# Patient Record
Sex: MALE | Race: WHITE | Employment: OTHER | URBAN - METROPOLITAN AREA
[De-identification: names, ages, dates, MRNs, and addresses within clinical notes are randomized per-mention and may not be internally consistent; named-entity substitution may affect disease eponyms.]

---

## 2021-02-15 NOTE — PROGRESS NOTES
hospitalization, the order may or may not be in effect during this procedure. I give my doctor permission to give me blood or blood products. I understand that there are risks with receiving blood such as hepatitis, AIDS, fever, or allergic reaction. I acknowledge that the risks, benefits, and alternatives of this treatment have been explained to me and that no express or implied warranty has been given by the hospital, any blood bank, or any person or entity as to the blood or blood components transfused. At the discretion of my doctor, I agree to allow observers, equipment/product representatives and allow photographing, and/or televising of the procedure, provided my name or identity is maintained confidentially. I agree the hospital may dispose of or use for scientific or educational purposes any tissue, fluid, or body parts which may be removed.     ________________________________Date________Time______ am/pm  (Madelia One)  Patient or Signature of Closest Relative or Legal Guardian    ________________________________Date________Time______am/pm      Page 1 of  1  Witness

## 2021-02-17 ENCOUNTER — NURSE ONLY (OUTPATIENT)
Dept: PRIMARY CARE CLINIC | Age: 71
End: 2021-02-17
Payer: OTHER GOVERNMENT

## 2021-02-17 DIAGNOSIS — Z01.818 PREOP EXAMINATION: Primary | ICD-10-CM

## 2021-02-17 PROCEDURE — 99211 OFF/OP EST MAY X REQ PHY/QHP: CPT | Performed by: NURSE PRACTITIONER

## 2021-02-17 RX ORDER — MULTIVITAMIN WITH IRON
1 TABLET ORAL DAILY
COMMUNITY

## 2021-02-17 RX ORDER — ATORVASTATIN CALCIUM 40 MG/1
40 TABLET, FILM COATED ORAL DAILY
COMMUNITY

## 2021-02-17 RX ORDER — OMEPRAZOLE 20 MG/1
20 CAPSULE, DELAYED RELEASE ORAL DAILY
COMMUNITY

## 2021-02-17 NOTE — PROGRESS NOTES
[phone]. 4. Please call [phone] option #2 option #2 if you have not been preregistered yet. On the day of your procedure bring your insurance card and photo ID. You will be registered at your bedside once brought back to your room. 5. DO NOT EAT ANYTHING eight hours prior to your arrival for surgery. May have 8 ounces of water 4 hours prior to your arrival for surgery. NOTE: ALL Gastric, Bariatric and Bowel surgery patients MUST follow their surgeon's instructions. 6. MEDICATIONS    Take the following medications with a SMALL sip of water: carbidopa-levodopa and omeprazole    Use your usual dose of inhalers the morning of surgery. BRING your rescue inhaler with you to hospital.    Anesthesia does NOT want you to take insulin the morning of surgery. They will control your blood sugar while you are at the hospital. Please contact your ordering physician for instructions regarding your insulin the night before your procedure. If you have an insulin pump, please keep it set on basal rate. 7. Do not swallow water when brushing teeth. No gum, candy, mints or ice chips. Refrain from smoking or at least decrease the amount. 8. Dress in loose, comfortable clothing appropriate for redressing after your procedure. Do not wear jewelry (including body piercings), make-up (especially NO eye make-up), fingernail polish (NO toenail polish if foot/leg surgery), lotion, powders or metal hairclips. 9. Dentures, glasses, or contacts will need to be removed before your procedure. Bring cases for your glasses, contacts, dentures, or hearing aids to protect them while you are in surgery. 10. If you use a CPAP, please bring it with you on the day of your procedure. 11. We recommend that valuable personal  belongings such as cash, cell phones, e-tablets or jewelry, be left at home during your stay. The hospital will not be responsible for valuables that are not secured in the hospital safe. However, if your insurance requires a co-pay, you may want to bring a method of payment, i.e. Check or credit card, if you wish to pay your co-pay the day of surgery. 12. If you are to stay overnight, you may bring a bag with personal items. Please have any large items you may need brought in by your family after your arrival to your hospital room. 15. If you have a Living Will or Durable Power of , please bring a copy on the day of your procedure. 15. With your permission, one family member may accompany you while you are being prepared for surgery. Once you are ready, additional family members may join you. HOW WE KEEP YOU SAFE and WORK TO PREVENT SURGICAL SITE INFECTIONS:  1. Health care workers should always check your ID bracelet to verify your name and birth date. You will be asked many times to state your name, date of birth, and allergies. 2. Health care workers should always clean their hands with soap or alcohol gel before providing care to you. It is okay to ask anyone if they cleaned their hands before they touch you. 3. You will be actively involved in verifying the type of procedure you are having and ensuring the correct surgical site. This will be confirmed multiple times prior to your procedure. Do NOT elina your surgery site UNLESS instructed to by your surgeon. 4. Do not shave or wax for 72 hours prior to procedure near your operative site. Shaving with a razor can irritate your skin and make it easier to develop an infection. On the day of your procedure, any hair that needs to be removed near the surgical site will be clipped by a healthcare worker using a special clippers designed to avoid skin irritation. 5. When you are in the operating room, your surgical site will be cleansed with a special soap, and in most cases, you will be given an antibiotic before the surgery begins. What to expect AFTER YOUR PROCEDURE:  1.  Immediately following your procedure, your will be taken to the PACU for the first phase of your recovery. Your nurse will help you recover from any potential side effects of anesthesia, such as extreme drowsiness, changes in your vital signs or breathing patterns. Nausea, headache, muscle aches, or sore throat may also occur after anesthesia. Your nurse will help you manage these potential side effects. 2. For comfort and safety, arrange to have someone at home with you for the first 24 hours after discharge. 3. You and your family will be given written instructions about your diet, activity, dressing care, medications, and return visits. 4. Once at home, should issues with nausea, pain, or bleeding occur, or should you notice any signs of infection, you should call your surgeon. 5. Always clean your hands before and after caring for your wound. Do not let your family touch your surgery site without cleaning their hands. 6. Narcotic pain medications can cause significant constipation. You may want to add a stool softener to your postoperative medication schedule or speak to your surgeon on how best to manage this SIDE EFFECT. SPECIAL INSTRUCTIONS spoke with wife Amos Pinto, advised to call surgeon with questions on insurance approval, wife advised to have covid testing done today and initially she said he would have it done here, then said [place] in [place] of which I said we need it sooner than later and that our covid tent might be closed tomorrow due to anticipation of snow storm. Also, wife stated cardiac clearance had been requested by his pcp and she had not heard back on that and she will reach out to them. Wife acknowledges understanding of pre op instructions. Thank you for allowing us to care for you. We strive to exceed your expectations in the delivery of care and service provided to you and your family.      If you need to contact the [facility] [place] [address] staff for any reason, please call us at [phone]    Instructions reviewed with patient during preadmission testing phone interview. Wes Moss. 2/17/2021 .9:53 AM      ADDITIONAL EDUCATIONAL INFORMATION REVIEWED PER PHONE WITH YOU AND/OR YOUR FAMILY:  No Bring a urine sample on day of surgery  Yes Hibiclens® Bathing Instructions   No Antibacterial Soap

## 2021-02-17 NOTE — PROGRESS NOTES
Wife states H & P done at [facility] with Dr. Tra Wheat [phone] I spoke with Nallely Maki there and she states it was just faxed to [facility] this morning along with labs and ekg. I left message with Evin De La Torre at [facility] office to place above in media or fax to us. Wife stated also she thought patient needed cardiac clearance which she stated was through same [facility] however in speaking with Nallely Maki there she states she does not see that a consult was requested. Will review H & P when received to clarify if cardiac clearance was requested officially. Informed wife of covid testing needed to be done today; explained that our covid tent might not be open tomorrow due to weather changes expected. Initially she stated they will be down to [facility] and have done today, hours given. Then, she stated he might go to the [place] to have done. I expressed we needed it completed sooner than later of which she acknowledged understanding. Spoke with Evin De La Torre at Dr. Jose Poon office; Informed her PCP H & P is incomplete, no signature and  It stats f/u with cardiology so is cardiac clearance needed and EKG showing sinus bradycardia and septal infarc age undetermined so that these things need to be addressed; my office number given to her. Evin De La Torre from Dr. Jose Poon called today 2/18/21 with an update; a person from the [place] called her and stated patient is seeing cardiologist today (unsure of name of physician he is seeing) and they will touch base with her tomorrow with the findings. Evin De La Torre said she will call me with that information tomorrow and if she receives any documentation she will fax to us.

## 2021-02-17 NOTE — PROGRESS NOTES
Discharge planning:  Patient to be discharged day of surgery to home with wife, Dwight Chopra for recovery.

## 2021-02-18 LAB — SARS-COV-2, NAA: NOT DETECTED

## 2021-02-19 ENCOUNTER — ANESTHESIA EVENT (OUTPATIENT)
Dept: OPERATING ROOM | Age: 71
End: 2021-02-19
Payer: OTHER GOVERNMENT

## 2021-02-23 ENCOUNTER — ANESTHESIA (OUTPATIENT)
Dept: OPERATING ROOM | Age: 71
End: 2021-02-23
Payer: OTHER GOVERNMENT

## 2021-02-23 ENCOUNTER — HOSPITAL ENCOUNTER (OUTPATIENT)
Age: 71
Setting detail: OUTPATIENT SURGERY
Discharge: HOME | End: 2021-02-23
Attending: NEUROLOGICAL SURGERY | Admitting: NEUROLOGICAL SURGERY
Payer: OTHER GOVERNMENT

## 2021-02-23 VITALS
HEIGHT: 67 IN | BODY MASS INDEX: 21.35 KG/M2 | RESPIRATION RATE: 20 BRPM | DIASTOLIC BLOOD PRESSURE: 62 MMHG | TEMPERATURE: 98.1 F | SYSTOLIC BLOOD PRESSURE: 100 MMHG | HEART RATE: 62 BPM | OXYGEN SATURATION: 96 % | WEIGHT: 136 LBS

## 2021-02-23 VITALS — OXYGEN SATURATION: 99 % | SYSTOLIC BLOOD PRESSURE: 85 MMHG | DIASTOLIC BLOOD PRESSURE: 54 MMHG

## 2021-02-23 LAB
ABO/RH: NORMAL
ANTIBODY SCREEN: NORMAL
APTT: 36.5 SEC (ref 24.2–36.2)
GLUCOSE BLD-MCNC: 82 MG/DL (ref 70–99)
INR BLD: 1.02 (ref 0.86–1.14)
PERFORMED ON: NORMAL
PROTHROMBIN TIME: 11.8 SEC (ref 10–13.2)

## 2021-02-23 PROCEDURE — 2580000003 HC RX 258: Performed by: ANESTHESIOLOGY

## 2021-02-23 PROCEDURE — 7100000000 HC PACU RECOVERY - FIRST 15 MIN: Performed by: NEUROLOGICAL SURGERY

## 2021-02-23 PROCEDURE — 85730 THROMBOPLASTIN TIME PARTIAL: CPT

## 2021-02-23 PROCEDURE — 3600000014 HC SURGERY LEVEL 4 ADDTL 15MIN: Performed by: NEUROLOGICAL SURGERY

## 2021-02-23 PROCEDURE — C1787 PATIENT PROGR, NEUROSTIM: HCPCS | Performed by: NEUROLOGICAL SURGERY

## 2021-02-23 PROCEDURE — 85610 PROTHROMBIN TIME: CPT

## 2021-02-23 PROCEDURE — 7100000011 HC PHASE II RECOVERY - ADDTL 15 MIN: Performed by: NEUROLOGICAL SURGERY

## 2021-02-23 PROCEDURE — 3700000000 HC ANESTHESIA ATTENDED CARE: Performed by: NEUROLOGICAL SURGERY

## 2021-02-23 PROCEDURE — 3700000001 HC ADD 15 MINUTES (ANESTHESIA): Performed by: NEUROLOGICAL SURGERY

## 2021-02-23 PROCEDURE — 6360000002 HC RX W HCPCS: Performed by: NURSE ANESTHETIST, CERTIFIED REGISTERED

## 2021-02-23 PROCEDURE — 7100000001 HC PACU RECOVERY - ADDTL 15 MIN: Performed by: NEUROLOGICAL SURGERY

## 2021-02-23 PROCEDURE — 6360000002 HC RX W HCPCS: Performed by: NEUROLOGICAL SURGERY

## 2021-02-23 PROCEDURE — 86901 BLOOD TYPING SEROLOGIC RH(D): CPT

## 2021-02-23 PROCEDURE — 7100000010 HC PHASE II RECOVERY - FIRST 15 MIN: Performed by: NEUROLOGICAL SURGERY

## 2021-02-23 PROCEDURE — 86900 BLOOD TYPING SEROLOGIC ABO: CPT

## 2021-02-23 PROCEDURE — 86850 RBC ANTIBODY SCREEN: CPT

## 2021-02-23 PROCEDURE — 2580000003 HC RX 258: Performed by: NEUROLOGICAL SURGERY

## 2021-02-23 PROCEDURE — 2500000003 HC RX 250 WO HCPCS: Performed by: NEUROLOGICAL SURGERY

## 2021-02-23 PROCEDURE — 3600000004 HC SURGERY LEVEL 4 BASE: Performed by: NEUROLOGICAL SURGERY

## 2021-02-23 PROCEDURE — C1767 GENERATOR, NEURO NON-RECHARG: HCPCS | Performed by: NEUROLOGICAL SURGERY

## 2021-02-23 PROCEDURE — 2709999900 HC NON-CHARGEABLE SUPPLY: Performed by: NEUROLOGICAL SURGERY

## 2021-02-23 PROCEDURE — 2720000010 HC SURG SUPPLY STERILE: Performed by: NEUROLOGICAL SURGERY

## 2021-02-23 PROCEDURE — 6360000002 HC RX W HCPCS: Performed by: ANESTHESIOLOGY

## 2021-02-23 DEVICE — IMPLANTABLE DEVICE: Type: IMPLANTABLE DEVICE | Site: CHEST | Status: FUNCTIONAL

## 2021-02-23 RX ORDER — SODIUM CHLORIDE, SODIUM LACTATE, POTASSIUM CHLORIDE, CALCIUM CHLORIDE 600; 310; 30; 20 MG/100ML; MG/100ML; MG/100ML; MG/100ML
INJECTION, SOLUTION INTRAVENOUS CONTINUOUS
Status: DISCONTINUED | OUTPATIENT
Start: 2021-02-23 | End: 2021-02-23 | Stop reason: HOSPADM

## 2021-02-23 RX ORDER — OXYCODONE HYDROCHLORIDE AND ACETAMINOPHEN 5; 325 MG/1; MG/1
1 TABLET ORAL PRN
Status: DISCONTINUED | OUTPATIENT
Start: 2021-02-23 | End: 2021-02-23 | Stop reason: HOSPADM

## 2021-02-23 RX ORDER — OXYCODONE HYDROCHLORIDE AND ACETAMINOPHEN 5; 325 MG/1; MG/1
2 TABLET ORAL PRN
Status: DISCONTINUED | OUTPATIENT
Start: 2021-02-23 | End: 2021-02-23 | Stop reason: HOSPADM

## 2021-02-23 RX ORDER — PROCHLORPERAZINE EDISYLATE 5 MG/ML
5 INJECTION INTRAMUSCULAR; INTRAVENOUS
Status: DISCONTINUED | OUTPATIENT
Start: 2021-02-23 | End: 2021-02-23 | Stop reason: HOSPADM

## 2021-02-23 RX ORDER — ONDANSETRON 2 MG/ML
4 INJECTION INTRAMUSCULAR; INTRAVENOUS
Status: DISCONTINUED | OUTPATIENT
Start: 2021-02-23 | End: 2021-02-23 | Stop reason: HOSPADM

## 2021-02-23 RX ORDER — ONDANSETRON 2 MG/ML
INJECTION INTRAMUSCULAR; INTRAVENOUS PRN
Status: DISCONTINUED | OUTPATIENT
Start: 2021-02-23 | End: 2021-02-23 | Stop reason: SDUPTHER

## 2021-02-23 RX ORDER — CEFAZOLIN SODIUM 2 G/50ML
2000 SOLUTION INTRAVENOUS ONCE
Status: COMPLETED | OUTPATIENT
Start: 2021-02-23 | End: 2021-02-23

## 2021-02-23 RX ORDER — FENTANYL CITRATE 50 UG/ML
25 INJECTION, SOLUTION INTRAMUSCULAR; INTRAVENOUS EVERY 5 MIN PRN
Status: DISCONTINUED | OUTPATIENT
Start: 2021-02-23 | End: 2021-02-23 | Stop reason: HOSPADM

## 2021-02-23 RX ORDER — PROPOFOL 10 MG/ML
INJECTION, EMULSION INTRAVENOUS CONTINUOUS PRN
Status: DISCONTINUED | OUTPATIENT
Start: 2021-02-23 | End: 2021-02-23 | Stop reason: SDUPTHER

## 2021-02-23 RX ORDER — PROPOFOL 10 MG/ML
INJECTION, EMULSION INTRAVENOUS PRN
Status: DISCONTINUED | OUTPATIENT
Start: 2021-02-23 | End: 2021-02-23 | Stop reason: SDUPTHER

## 2021-02-23 RX ORDER — HYDRALAZINE HYDROCHLORIDE 20 MG/ML
5 INJECTION INTRAMUSCULAR; INTRAVENOUS EVERY 10 MIN PRN
Status: DISCONTINUED | OUTPATIENT
Start: 2021-02-23 | End: 2021-02-23 | Stop reason: HOSPADM

## 2021-02-23 RX ORDER — LIDOCAINE HYDROCHLORIDE 20 MG/ML
INJECTION, SOLUTION INTRAVENOUS PRN
Status: DISCONTINUED | OUTPATIENT
Start: 2021-02-23 | End: 2021-02-23 | Stop reason: SDUPTHER

## 2021-02-23 RX ORDER — LABETALOL HYDROCHLORIDE 5 MG/ML
5 INJECTION, SOLUTION INTRAVENOUS EVERY 10 MIN PRN
Status: DISCONTINUED | OUTPATIENT
Start: 2021-02-23 | End: 2021-02-23 | Stop reason: HOSPADM

## 2021-02-23 RX ORDER — FENTANYL CITRATE 50 UG/ML
50 INJECTION, SOLUTION INTRAMUSCULAR; INTRAVENOUS EVERY 5 MIN PRN
Status: DISCONTINUED | OUTPATIENT
Start: 2021-02-23 | End: 2021-02-23 | Stop reason: HOSPADM

## 2021-02-23 RX ORDER — MEPERIDINE HYDROCHLORIDE 25 MG/ML
12.5 INJECTION INTRAMUSCULAR; INTRAVENOUS; SUBCUTANEOUS EVERY 5 MIN PRN
Status: DISCONTINUED | OUTPATIENT
Start: 2021-02-23 | End: 2021-02-23 | Stop reason: HOSPADM

## 2021-02-23 RX ORDER — DIPHENHYDRAMINE HYDROCHLORIDE 50 MG/ML
12.5 INJECTION INTRAMUSCULAR; INTRAVENOUS
Status: DISCONTINUED | OUTPATIENT
Start: 2021-02-23 | End: 2021-02-23 | Stop reason: HOSPADM

## 2021-02-23 RX ADMIN — FENTANYL CITRATE 25 MCG: 50 INJECTION, SOLUTION INTRAMUSCULAR; INTRAVENOUS at 09:30

## 2021-02-23 RX ADMIN — LIDOCAINE HYDROCHLORIDE 100 MG: 20 INJECTION, SOLUTION INTRAVENOUS at 09:23

## 2021-02-23 RX ADMIN — PHENYLEPHRINE HYDROCHLORIDE 80 MCG: 10 INJECTION, SOLUTION INTRAMUSCULAR; INTRAVENOUS; SUBCUTANEOUS at 10:17

## 2021-02-23 RX ADMIN — SODIUM CHLORIDE, POTASSIUM CHLORIDE, SODIUM LACTATE AND CALCIUM CHLORIDE: 600; 310; 30; 20 INJECTION, SOLUTION INTRAVENOUS at 08:24

## 2021-02-23 RX ADMIN — PROPOFOL 30 MG: 10 INJECTION, EMULSION INTRAVENOUS at 09:23

## 2021-02-23 RX ADMIN — PROPOFOL 70 MCG/KG/MIN: 10 INJECTION, EMULSION INTRAVENOUS at 09:27

## 2021-02-23 RX ADMIN — CEFAZOLIN SODIUM 2 G: 2 SOLUTION INTRAVENOUS at 09:28

## 2021-02-23 RX ADMIN — ONDANSETRON 4 MG: 2 INJECTION INTRAMUSCULAR; INTRAVENOUS at 09:40

## 2021-02-23 RX ADMIN — PHENYLEPHRINE HYDROCHLORIDE 80 MCG: 10 INJECTION, SOLUTION INTRAMUSCULAR; INTRAVENOUS; SUBCUTANEOUS at 09:44

## 2021-02-23 RX ADMIN — PHENYLEPHRINE HYDROCHLORIDE 80 MCG: 10 INJECTION, SOLUTION INTRAMUSCULAR; INTRAVENOUS; SUBCUTANEOUS at 09:32

## 2021-02-23 RX ADMIN — PHENYLEPHRINE HYDROCHLORIDE 80 MCG: 10 INJECTION, SOLUTION INTRAMUSCULAR; INTRAVENOUS; SUBCUTANEOUS at 10:00

## 2021-02-23 ASSESSMENT — PULMONARY FUNCTION TESTS
PIF_VALUE: 1
PIF_VALUE: 0
PIF_VALUE: 1
PIF_VALUE: 0
PIF_VALUE: 1

## 2021-02-23 NOTE — PROGRESS NOTES
Patient not talking but gestures and follows commands appropriately. Wife updated. Reports patient does talk.

## 2021-02-23 NOTE — PROGRESS NOTES
PACU Transfer to [facility]    Vitals:    02/23/21 1230   BP: 90/68   Pulse: 66   Resp: 24   Temp: 99.2 °F (37.3 °C)   SpO2: 95%         Intake/Output Summary (Last 24 hours) at 2/23/2021 1232  Last data filed at 2/23/2021 1230  Gross per 24 hour   Intake 915 ml   Output 0 ml   Net 915 ml       Pain assessment:  none  Pain Level: 0(\"no\")    Patient transferred to care of ZACH RN.    2/23/2021 12:32 PM

## 2021-02-23 NOTE — PROGRESS NOTES
Pt to SDS, pt awake and alert, pt denies pain/nausea/dizziness, incision to left chest wall CDI, incision well approximated, ice applied, wife brought to bedside and reviewed d/c instructions, tolerating po intake, peripheral IV removed. Ambulatory Surgery/Procedure Discharge Note    Vitals:    02/23/21 1308   BP: 100/62   Pulse: 62   Resp: 20   Temp:    SpO2:      Discharge criteria met per Hollie score. In: 6116 [P.O.:120; I.V.:915]  Out: 0     Restroom use offered before discharge. Yes    Pain assessment:  none  Pain Level: 0        Patient discharged to home/self care.  Patient discharged via wheel chair by transporter to waiting family/S.O.       2/23/2021 1:17 PM

## 2021-02-23 NOTE — H&P
[facility] Same Day Surgery Update H & P  Department of General Surgery   Surgical Service   Pre-operative History and Physical  Last H & P within the last 30 days. DIAGNOSIS:   Parkinson disease ([facility] [address] [G20]    Procedure(s):  REPLACEMENT OF LEFT DUAL CHANNEL DEEP BRAIN STIMULATOR BATTERY     HISTORY OF PRESENT ILLNESS:   Patient with Parkinson's disease presents today for DBS placement. Covid 19:  Patient denies fever, chills, cough or known exposure to Covid-19. Past Medical History:        Diagnosis Date    Arthritis     generalized    Atrial fibrillation (HCC)     CAD (coronary artery disease)     Dilated aortic root (HCC)     Hyperlipidemia     Limited range of motion (ROM) of shoulder     right    Non-STEMI (non-ST elevated myocardial infarction) ([facility] [address] 2016    Parkinson disease ([facility] [address]     S/P deep brain stimulator placement     Shoulder pain, right     Wears dentures      Past Surgical History:        Procedure Laterality Date    BACK SURGERY      ? lumbar surgery?  KNEE SURGERY Right     tear    SHOULDER SURGERY      right  x 3 with implant     Past Social History:  Social History     Socioeconomic History    Marital status:      Spouse name: None    Number of children: None    Years of education: None    Highest education level: None   Occupational History    None   Social Needs    Financial resource strain: None    Food insecurity     Worry: None     Inability: None    Transportation needs     Medical: None     Non-medical: None   Tobacco Use    Smoking status: Former Smoker     Types: Cigarettes     Quit date:      Years since quittin.1    Smokeless tobacco: Never Used   Substance and Sexual Activity    Alcohol use:  Yes     Alcohol/week: 1.0 standard drinks     Types: 1 Cans of beer per week     Comment: daily    Drug use: No    Sexual activity: None   Lifestyle    Physical activity     Days per week: None Minutes per session: None    Stress: None   Relationships    Social connections     Talks on phone: None     Gets together: None     Attends Rastafarian service: None     Active member of club or organization: None     Attends meetings of clubs or organizations: None     Relationship status: None    Intimate partner violence     Fear of current or ex partner: None     Emotionally abused: None     Physically abused: None     Forced sexual activity: None   Other Topics Concern    None   Social History Narrative    None         Medications Prior to Admission:      Prior to Admission medications    Medication Sig Start Date End Date Taking? Authorizing Provider   omeprazole (PRILOSEC) 20 MG delayed release capsule Take 20 mg by mouth daily   Yes Historical Provider, MD   atorvastatin (LIPITOR) 40 MG tablet Take 40 mg by mouth daily   Yes Historical Provider, MD   sertraline (ZOLOFT) 100 MG tablet Take 100 mg by mouth nightly 2 tablets daily   Yes Historical Provider, MD   Cyanocobalamin (VITAMIN B-12 IJ) Inject as directed every 30 days   Yes Historical Provider, MD   Carbidopa-Levodopa-Entacapone (STALEVO 200 PO) Take 200 mg by mouth three times daily. Yes Historical Provider, MD   Multiple Vitamin (MULTIVITAMIN) TABS tablet Take 1 tablet by mouth daily    Historical Provider, MD   Vitamin D (CHOLECALCIFEROL) 1000 UNITS CAPS capsule Take 1,000 Units by mouth daily.     Historical Provider, MD         Allergies:  Aspirin, Celebrex [celecoxib], and Flexeril [cyclobenzaprine]    PHYSICAL EXAM:      BP (!) 153/85   Pulse 55   Temp 97.7 °F (36.5 °C) (Oral)   Resp 20   Ht 5' 7\" (1.702 m)   Wt 136 lb (61.7 kg)   SpO2 99% Comment: left great toe  BMI 21.30 kg/m²      Airway:  Airway patent with no audible stridor    Heart:  Bradycardic, regular rhythm, No murmur noted    Lungs:  No increased work of breathing, good air exchange, clear to auscultation bilaterally, no crackles or wheezing    Abdomen:  soft, non-distended, non-tender, no rebound tenderness or guarding, normal active bowel sounds and no masses palpated    ASSESSMENT AND PLAN     Patient is a 69 y.o. male with above specified procedure planned. 1.  The patients history and physical was obtained and signed off by the pre-admission testing department. Patient seen and focused exam done today- no new changes since last physical exam on 2/12/21    2. Access to ancillary services are available per request of the provider.     HERMAN Brothers - CNP     2/23/2021

## 2021-02-23 NOTE — PROGRESS NOTES
Pt arrived form OR, s/p REPLACEMENT OF LEFT DUAL CHANNEL DEEP BRAIN STIMULATOR BATTERY - Left, report received from CRNA, pt sleepy on arrival, pt can be confused as reported from CRNA

## 2021-02-23 NOTE — ANESTHESIA POSTPROCEDURE EVALUATION
Department of Anesthesiology  Postprocedure Note    Patient: John Lofton  MRN: [id number]  YOB: 1950  Date of evaluation: 2/23/2021  Time:  11:29 AM     Procedure Summary     Date: 02/23/21 Room / Location: [facility]    Anesthesia Start: 0911 Anesthesia Stop: 1025    Procedure: REPLACEMENT OF LEFT DUAL CHANNEL DEEP BRAIN STIMULATOR BATTERY (Left ) Diagnosis:       Parkinson disease ([facility] [address]      (Parkinson disease ([facility] [address] Halima Earl)    Surgeons: Stacey Hyatt MD Responsible Provider: Antonella Solis MD    Anesthesia Type: MAC ASA Status: 3          Anesthesia Type: MAC    Hollie Phase I: Hollie Score: 8    Hollie Phase II:      Last vitals: Reviewed and per EMR flowsheets.        Anesthesia Post Evaluation    Patient location during evaluation: PACU  Patient participation: complete - patient participated  Level of consciousness: awake  Pain score: 2  Airway patency: patent  Nausea & Vomiting: no nausea and no vomiting  Complications: no  Cardiovascular status: hemodynamically stable  Respiratory status: acceptable  Hydration status: euvolemic

## 2021-02-23 NOTE — PROGRESS NOTES
Dr. Curtis Milling in to see the pt, [employer] rep here as well, settings are the same and device is ON

## 2021-02-24 NOTE — OP NOTE
Pocket was irrigated, hemostased, and closed with two interrupted Vicryl  sutures for deep layers, three interrupted 4-0 Monocryl and Dermabond  for the skin. No complications noted and I certify that I was present  and available for the entire procedure.         Eloy Carrillo MD    D: 02/23/2021 11:01:36       T: 02/23/2021 14:27:54     [email or web address]  Job#: [id number]     Doc#: [id number]    CC:

## 2023-01-01 ENCOUNTER — INPATIENT HOSPITAL (OUTPATIENT)
Dept: URBAN - METROPOLITAN AREA HOSPITAL 56 | Facility: HOSPITAL | Age: 73
End: 2023-01-01

## 2023-01-01 ENCOUNTER — INPATIENT HOSPITAL (OUTPATIENT)
Dept: URBAN - METROPOLITAN AREA HOSPITAL 104 | Facility: HOSPITAL | Age: 73
End: 2023-01-01

## 2023-01-01 DIAGNOSIS — E43 UNSPECIFIED SEVERE PROTEIN-CALORIE MALNUTRITION: ICD-10-CM

## 2023-01-01 DIAGNOSIS — Z43.1 ENCOUNTER FOR ATTENTION TO GASTROSTOMY: ICD-10-CM

## 2023-01-01 DIAGNOSIS — D64.9 ANEMIA, UNSPECIFIED: ICD-10-CM

## 2023-01-01 DIAGNOSIS — G20 PARKINSON'S DISEASE: ICD-10-CM

## 2023-01-01 DIAGNOSIS — L03.311 CELLULITIS OF ABDOMINAL WALL: ICD-10-CM

## 2023-01-01 DIAGNOSIS — K21.9 GASTRO-ESOPHAGEAL REFLUX DISEASE WITHOUT ESOPHAGITIS: ICD-10-CM

## 2023-01-01 DIAGNOSIS — K94.22 GASTROSTOMY INFECTION: ICD-10-CM

## 2023-01-01 PROCEDURE — 99232 SBSQ HOSP IP/OBS MODERATE 35: CPT | Performed by: NURSE PRACTITIONER

## 2023-01-01 PROCEDURE — 99223 1ST HOSP IP/OBS HIGH 75: CPT

## 2023-01-01 PROCEDURE — 99222 1ST HOSP IP/OBS MODERATE 55: CPT | Mod: SA | Performed by: PHYSICIAN ASSISTANT

## 2023-01-01 PROCEDURE — 43246 EGD PLACE GASTROSTOMY TUBE: CPT | Performed by: INTERNAL MEDICINE

## 2023-01-01 PROCEDURE — 99232 SBSQ HOSP IP/OBS MODERATE 35: CPT | Mod: SA | Performed by: PHYSICIAN ASSISTANT

## 2023-10-09 ENCOUNTER — HOSPITAL ENCOUNTER (INPATIENT)
Age: 73
LOS: 4 days | Discharge: OTHER INSTITUTION | End: 2023-10-13
Attending: STUDENT IN AN ORGANIZED HEALTH CARE EDUCATION/TRAINING PROGRAM | Admitting: STUDENT IN AN ORGANIZED HEALTH CARE EDUCATION/TRAINING PROGRAM
Payer: OTHER GOVERNMENT

## 2023-10-09 ENCOUNTER — APPOINTMENT (OUTPATIENT)
Dept: CT IMAGING | Age: 73
End: 2023-10-09
Payer: OTHER GOVERNMENT

## 2023-10-09 ENCOUNTER — APPOINTMENT (OUTPATIENT)
Dept: GENERAL RADIOLOGY | Age: 73
End: 2023-10-09
Payer: OTHER GOVERNMENT

## 2023-10-09 DIAGNOSIS — D72.829 LEUKOCYTOSIS, UNSPECIFIED TYPE: ICD-10-CM

## 2023-10-09 DIAGNOSIS — D64.9 ANEMIA, UNSPECIFIED TYPE: ICD-10-CM

## 2023-10-09 DIAGNOSIS — K94.23 PEG TUBE MALFUNCTION (HCC): Primary | ICD-10-CM
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
## 2023-10-09 LAB
ALBUMIN SERPL-MCNC: 3.1 GM/DL (ref 3.4–5)
ALP BLD-CCNC: 104 IU/L (ref 40–129)
ALT SERPL-CCNC: 26 U/L (ref 10–40)
ANION GAP SERPL CALCULATED.3IONS-SCNC: 8 MMOL/L (ref 4–16)
AST SERPL-CCNC: 20 IU/L (ref 15–37)
BASOPHILS ABSOLUTE: 0.1 K/CU MM
BASOPHILS RELATIVE PERCENT: 0.4 % (ref 0–1)
BILIRUB SERPL-MCNC: 0.2 MG/DL (ref 0–1)
BUN SERPL-MCNC: 19 MG/DL (ref 6–23)
CALCIUM SERPL-MCNC: 9.1 MG/DL (ref 8.3–10.6)
CHLORIDE BLD-SCNC: 99 MMOL/L (ref 99–110)
CO2: 33 MMOL/L (ref 21–32)
CREAT SERPL-MCNC: 0.6 MG/DL (ref 0.9–1.3)
DIFFERENTIAL TYPE: ABNORMAL
EOSINOPHILS ABSOLUTE: 0.1 K/CU MM
EOSINOPHILS RELATIVE PERCENT: 0.4 % (ref 0–3)
GFR SERPL CREATININE-BSD FRML MDRD: >60 ML/MIN/1.73M2
GLUCOSE SERPL-MCNC: 93 MG/DL (ref 70–99)
HCT VFR BLD CALC: 34.5 % (ref 42–52)
HEMOGLOBIN: 11.4 GM/DL (ref 13.5–18)
IMMATURE NEUTROPHIL %: 0.5 % (ref 0–0.43)
INR BLD: 1 INDEX
LYMPHOCYTES ABSOLUTE: 0.7 K/CU MM
LYMPHOCYTES RELATIVE PERCENT: 3.9 % (ref 24–44)
MCH RBC QN AUTO: 32.7 PG (ref 27–31)
MCHC RBC AUTO-ENTMCNC: 33 % (ref 32–36)
MCV RBC AUTO: 98.9 FL (ref 78–100)
MONOCYTES ABSOLUTE: 1.5 K/CU MM
MONOCYTES RELATIVE PERCENT: 9.1 % (ref 0–4)
NUCLEATED RBC %: 0 %
PDW BLD-RTO: 13.7 % (ref 11.7–14.9)
PLATELET # BLD: 426 K/CU MM (ref 140–440)
PMV BLD AUTO: 9.9 FL (ref 7.5–11.1)
POTASSIUM SERPL-SCNC: 4.4 MMOL/L (ref 3.5–5.1)
PROTHROMBIN TIME: 13.8 SECONDS (ref 11.7–14.5)
RBC # BLD: 3.49 M/CU MM (ref 4.6–6.2)
SEGMENTED NEUTROPHILS ABSOLUTE COUNT: 14.2 K/CU MM
SEGMENTED NEUTROPHILS RELATIVE PERCENT: 85.7 % (ref 36–66)
SODIUM BLD-SCNC: 140 MMOL/L (ref 135–145)
TOTAL IMMATURE NEUTOROPHIL: 0.08 K/CU MM
TOTAL NUCLEATED RBC: 0 K/CU MM
TOTAL PROTEIN: 6.4 GM/DL (ref 6.4–8.2)
WBC # BLD: 16.5 K/CU MM (ref 4–10.5)

## 2023-10-09 PROCEDURE — 70450 CT HEAD/BRAIN W/O DYE: CPT

## 2023-10-09 PROCEDURE — 6360000004 HC RX CONTRAST MEDICATION: Performed by: STUDENT IN AN ORGANIZED HEALTH CARE EDUCATION/TRAINING PROGRAM

## 2023-10-09 PROCEDURE — 85025 COMPLETE CBC W/AUTO DIFF WBC: CPT

## 2023-10-09 PROCEDURE — 0DP6XUZ REMOVAL OF FEEDING DEVICE FROM STOMACH, EXTERNAL APPROACH: ICD-10-PCS | Performed by: STUDENT IN AN ORGANIZED HEALTH CARE EDUCATION/TRAINING PROGRAM

## 2023-10-09 PROCEDURE — 72125 CT NECK SPINE W/O DYE: CPT

## 2023-10-09 PROCEDURE — 1200000000 HC SEMI PRIVATE

## 2023-10-09 PROCEDURE — 80053 COMPREHEN METABOLIC PANEL: CPT

## 2023-10-09 PROCEDURE — 74177 CT ABD & PELVIS W/CONTRAST: CPT

## 2023-10-09 PROCEDURE — 99285 EMERGENCY DEPT VISIT HI MDM: CPT

## 2023-10-09 PROCEDURE — 71045 X-RAY EXAM CHEST 1 VIEW: CPT

## 2023-10-09 PROCEDURE — 72170 X-RAY EXAM OF PELVIS: CPT

## 2023-10-09 PROCEDURE — 85610 PROTHROMBIN TIME: CPT

## 2023-10-09 RX ORDER — ALBUTEROL SULFATE 90 UG/1
2 AEROSOL, METERED RESPIRATORY (INHALATION) EVERY 6 HOURS PRN
COMMUNITY

## 2023-10-09 RX ORDER — ONDANSETRON 4 MG/1
4 TABLET, FILM COATED ORAL EVERY 4 HOURS PRN
COMMUNITY

## 2023-10-09 RX ORDER — FAMOTIDINE 20 MG/1
20 TABLET, FILM COATED ORAL 2 TIMES DAILY
COMMUNITY

## 2023-10-09 RX ORDER — POLYETHYLENE GLYCOL 3350 17 G/17G
17 POWDER, FOR SOLUTION ORAL DAILY
COMMUNITY

## 2023-10-09 RX ORDER — ACETAMINOPHEN 650 MG/20.3ML
650 SUSPENSION ORAL EVERY 4 HOURS PRN
COMMUNITY

## 2023-10-09 RX ORDER — KETOCONAZOLE 20 MG/G
CREAM TOPICAL 2 TIMES DAILY
COMMUNITY

## 2023-10-09 RX ORDER — CALCIUM CARBONATE/VITAMIN D3 500MG-5MCG
1 TABLET ORAL
COMMUNITY

## 2023-10-09 RX ORDER — BISACODYL 10 MG
10 SUPPOSITORY, RECTAL RECTAL DAILY
COMMUNITY

## 2023-10-09 RX ORDER — ENEMA 19; 7 G/133ML; G/133ML
1 ENEMA RECTAL DAILY PRN
COMMUNITY

## 2023-10-09 RX ORDER — GUAIFENESIN 200 MG/10ML
200 LIQUID ORAL EVERY 4 HOURS PRN
COMMUNITY

## 2023-10-09 RX ADMIN — IOPAMIDOL 80 ML: 755 INJECTION, SOLUTION INTRAVENOUS at 19:34

## 2023-10-09 ASSESSMENT — LIFESTYLE VARIABLES
HOW OFTEN DO YOU HAVE A DRINK CONTAINING ALCOHOL: NEVER
HOW MANY STANDARD DRINKS CONTAINING ALCOHOL DO YOU HAVE ON A TYPICAL DAY: PATIENT DOES NOT DRINK

## 2023-10-09 NOTE — ED TRIAGE NOTES
[place] [place] and had [place] unwitnessed [place]. Pt pulled out Gtube and has a potential fracture to right arm.

## 2023-10-09 NOTE — PROGRESS NOTES
Chart was reviewed. PEG tube was placed Endoscopically at [facility]. PEG tube will need to be placed surgically at this time. Dr Tony Becerra spoke with ER physician regarding IR consult. IR will complete consult at this time.

## 2023-10-09 NOTE — PROGRESS NOTES
Awaiting CT. D/w CT dep. for PO contrast to be gastrografin contrast without delay to evaluate for perf due to dislodged, recently placed PEG tube.

## 2023-10-09 NOTE — ED NOTES
Medication History  [place]    Patient Name: Hector Huggins 1950     Medication history has been completed by: Damaso Monroe CPhT    Source(s) of information: Medication list provided by [facility]     Primary Care Physician: Joseline Garcia MD     Pharmacy:     Allergies as of 10/09/2023 - Fully Reviewed 10/09/2023   Allergen Reaction Noted    Aspirin  06/21/2012    Celebrex [celecoxib] Other (See Comments) 02/17/2021    Flexeril [cyclobenzaprine]  04/25/2013        Prior to Admission medications    Medication Sig Start Date End Date Taking?  Authorizing Provider   bisacodyl (DULCOLAX) 10 MG suppository Place 1 suppository rectally daily   Yes ProviderKendra MD   LACTOBACILLUS ACID-PECTIN PO Take 2 capsules by mouth 10/09/23 Receives via G-tube   Yes Kendra Marvin MD   Calcium Carb-Cholecalciferol 500-5 MG-MCG TABS Take 1 tablet by mouth 10/09/23 Receives via G-tube   Yes ProviderKendra MD   famotidine (PEPCID) 20 MG tablet 1 tablet by Per G Tube route 2 times daily   Yes ProviderKendra MD   polyethylene glycol (GLYCOLAX) 17 GM/SCOOP powder 17 g by Per G Tube route daily   Yes Provider, MD Kendra   ketoconazole (NIZORAL) 2 % cream Apply topically 2 times daily Apply topically twice daily to back and chest   Yes Provider, MD Kendra   sodium phosphate (FLEET) 7-19 GM/118ML Place 1 enema rectally daily as needed   Yes ProviderKendra MD   guaiFENesin (ROBITUSSIN) 100 MG/5ML liquid 10 mLs by Per G Tube route every 4 hours as needed for Cough   Yes Provider, MD Kendra   ondansetron (ZOFRAN) 4 MG tablet 1 tablet by Per G Tube route every 4 hours as needed for Nausea or Vomiting   Yes ProviderKendra MD   albuterol sulfate HFA (PROVENTIL;VENTOLIN;PROAIR) 108 (90 Base) MCG/ACT inhaler Inhale 2 puffs into the lungs every 6 hours as needed for Wheezing   Yes ProviderKendra MD   acetaminophen (TYLENOL) 650 MG/20.3ML SUSP 20.3 mLs by Per G Tube route every 4 hours as needed for Pain   Yes Kendra Marvin MD   sertraline (ZOLOFT) 50 MG tablet 1 tablet by Per G Tube route daily    Kendra Marvin MD   Cyanocobalamin (VITAMIN B-12 IJ) Inject 1,000 mcg as directed every 30 days 10/9/23 Receives on the 25 th of each month    Kendra Marvin MD   VITAMIN D  Units by Per G Tube route daily    Kendra Marvin MD     Medications added or changed (ex. new medication, dosage change, interval change, formulation change): Bisacodyl suppository (added)  Lactobacillus (added)  Calcium carbonate w D (added)  Famotidine (added)  Vitamin D dosage change from 1000 units to 400 units  Polyethylene (added)  Sertraline dosage change from 200 mg to 50 mg   Ketoconazole cream (added)  Fleets prn (added)  Guaifenesin syrup prn (added)  Ondansetron prn (added)  Albuterol inhaler prn (added)  Acetaminophen liquid prn (added)    Medications removed from list (include reason, ex. noncompliance, medication cost, therapy complete etc.):   MV Not on facility's medication list  Omeprazole Not on facility's medication list  Atorvastatin Not on facility's medication list  Carbidopa/levodopa Not on facility's medication list    Comments:  Medication list provided by [facility]  Reached out to facility for verification of med list provided. Patient with recent discharge from [place] [facility] per discharge notes:  Sertraline decreased d/t sleepiness  Atorvastatin d/c'd d/t poor PO  Doxycycline discontinued  MVI discontined  N progress notes report per wife patient was taking no parkinson's disease medications. Current medication list noted per discharge from [facility].     To my knowledge the above medication history is accurate as of 10/9/2023 2:21 PM.   Anamaria Hui CPhT   10/9/2023 2:21 PM

## 2023-10-09 NOTE — ED NOTES
Per Dr Esperanza Zimmerman since NG was unsuccessful the CT scan needs to be done without oral contrast and only with IV contrast.

## 2023-10-09 NOTE — ED PROVIDER NOTES
point my physical exam.  Chest x-ray and pelvis x-ray are both unremarkable for acute fractures. Due to the PEG tube tract not being fully mature patient was discussed with IR as above that recommends general surgery evaluation. General surgery as above the recommends CT abdomen and pelvis to evaluate the track. History from : Patient and Significant Other wife    Limitations to history : None    Patient was given the following medications:  Medications - No data to display    Independent Imaging Interpretation by me: Chest x-ray, pelvis x-ray      Chronic conditions affecting care: PEG tube dependency which is the reason of obtaining the PEG tube today    Discussion with Other Profesionals : Consultant    IR, general surgery    Social Determinants : None    Records Reviewed : Inpatient Notes last discharge notes, Op note from last EGD placement      Disposition Considerations (tests considered but not done, Shared Decision Making, Pt Expectation of Test or Tx.): Patient signed out to incoming attending, final disposition by her. Anticipate that patient will be admitted for surgical placement of a PEG tube unless consultants are able to replace it at bedside. Patient also has pending CT head and CT cervical spine. I am the Primary Clinician of Record. Clinical Impression:  1. PEG tube malfunction ([address])    2. Fall, initial encounter      Disposition referral (if applicable):  No follow-up provider specified. Disposition medications (if applicable):  New Prescriptions    No medications on file     ED Provider Disposition Time  DISPOSITION  - Pending      Comment: Please note this report has been produced using speech recognition software and may contain errors related to that system including errors in grammar, punctuation, and spelling, as well as words and phrases that may be inappropriate. Efforts were made to edit the dictations.         Aimee Lemons MD  10/09/23 6532

## 2023-10-09 NOTE — ED NOTES
46 Dr Nirmala Tilley wants contrast through ng tube due to risk of aspiration and per wife pt hasn't had anything PO recently, will call Dr Nirmala Tilley when giving contrast so he can watch scan to decide what next step is for the pt. RN [facility] notified of pt needing ng tube.  1892 gastrografin, per Dr Nirmala Tilley wants to see in stomach so no long delay

## 2023-10-09 NOTE — ED PROVIDER NOTES
15:54:54)                Impression:    Bilateral brain stimulator wires extending to the thalami. There is artifact   over the brain parenchyma from the stimulator wires. Cerebral atrophy. Atherosclerotic disease of the cavernous carotid arteries. Mild chronic small vessel ischemic changes. No acute brain parenchymal   abnormality is appreciated. Narrative:    EXAMINATION:   CT OF THE HEAD WITHOUT CONTRAST  10/9/2023 2:14 pm     TECHNIQUE:   CT of the head was performed without the administration of intravenous   contrast. Automated exposure control, iterative reconstruction, and/or weight   based adjustment of the mA/kV was utilized to reduce the radiation dose to as   low as reasonably achievable. COMPARISON:   07/03/2017     HISTORY:   ORDERING SYSTEM PROVIDED HISTORY: Fall   TECHNOLOGIST PROVIDED HISTORY:   Reason for exam:->Fall   Has a \"code stroke\" or \"stroke alert\" been called? ->No   Decision Support Exception - unselect if not a suspected or confirmed   emergency medical condition->Emergency Medical Condition (MA)   Reason for Exam: Fall, head pain     Initial evaluation     FINDINGS:   BRAIN/VENTRICLES: The patient has bilateral brain stimulators with the tips   near the thalami. There is artifact from the stimulators over the brain   parenchyma. The ventricles and cisternal spaces are prominent consistent   with cerebral atrophy. There is atherosclerotic calcification of the   cavernous carotids. There are mild chronic small vessel ischemic changes. There is no midline shift or mass effect. No hemorrhage is identified in the   brain parenchyma. ORBITS: The visualized portion of the orbits demonstrate no acute abnormality. SINUSES: There is mucoperiosteal thickening of the right maxillary sinus. The   remainder of the sinuses are clear. There is partial opacification of the   right mastoid air cells. The left mastoid air cells are clear.      SOFT TISSUES/SKULL: 0.6 (*)     Albumin 3.1 (*)     All other components within normal limits   PROTIME-INR   URINALYSIS WITH REFLEX TO CULTURE     Clinical Impression:  1. PEG tube malfunction ([address])    2. Fall, initial encounter    3. Leukocytosis, unspecified type    4. Anemia, unspecified type        Comment: Please note this report has been produced using speech recognition software and may contain errors related to that system including errors in grammar, punctuation, and spelling, as well as words and phrases that may be inappropriate. If there are any questions or concerns please feel free to contact the dictating provider for clarification.        Zaira Mchugh MD  10/10/23 Zion Muniz

## 2023-10-09 NOTE — ED NOTES
[address] paged Dr Alexis Page  10/09/23 1511      1524 Dr Chanel Diego returned call      [facility]  10/09/23 1539

## 2023-10-09 NOTE — ED NOTES
Multiple attempts at NG unsuccessful, Dr Kelly Fonseca notified.      Amadeo Alonso, RN  10/09/23 9712

## 2023-10-10 ENCOUNTER — APPOINTMENT (OUTPATIENT)
Dept: INTERVENTIONAL RADIOLOGY/VASCULAR | Age: 73
End: 2023-10-10
Payer: OTHER GOVERNMENT

## 2023-10-10 LAB
ALBUMIN SERPL-MCNC: 1.9 GM/DL (ref 3.4–5)
ALP BLD-CCNC: 72 IU/L (ref 40–128)
ALT SERPL-CCNC: 14 U/L (ref 10–40)
ANION GAP SERPL CALCULATED.3IONS-SCNC: 10 MMOL/L (ref 4–16)
AST SERPL-CCNC: 11 IU/L (ref 15–37)
BACTERIA: NEGATIVE /HPF
BASOPHILS ABSOLUTE: 0.1 K/CU MM
BASOPHILS RELATIVE PERCENT: 0.3 % (ref 0–1)
BILIRUB SERPL-MCNC: 0.2 MG/DL (ref 0–1)
BILIRUBIN URINE: NEGATIVE MG/DL
BLOOD, URINE: ABNORMAL
BUN SERPL-MCNC: 14 MG/DL (ref 6–23)
CALCIUM OXALATE CRYSTALS: ABNORMAL /HPF
CALCIUM SERPL-MCNC: 6.8 MG/DL (ref 8.3–10.6)
CHLORIDE BLD-SCNC: 109 MMOL/L (ref 99–110)
CLARITY: CLEAR
CO2: 20 MMOL/L (ref 21–32)
COLOR: YELLOW
CREAT SERPL-MCNC: 0.3 MG/DL (ref 0.9–1.3)
DIFFERENTIAL TYPE: ABNORMAL
EOSINOPHILS ABSOLUTE: 0.1 K/CU MM
EOSINOPHILS RELATIVE PERCENT: 0.5 % (ref 0–3)
GFR SERPL CREATININE-BSD FRML MDRD: >60 ML/MIN/1.73M2
GLUCOSE BLD-MCNC: 79 MG/DL (ref 70–99)
GLUCOSE SERPL-MCNC: 64 MG/DL (ref 70–99)
GLUCOSE, URINE: NEGATIVE MG/DL
HCT VFR BLD CALC: 33.4 % (ref 42–52)
HEMOGLOBIN: 10.5 GM/DL (ref 13.5–18)
IMMATURE NEUTROPHIL %: 0.6 % (ref 0–0.43)
INR BLD: 1 INDEX
KETONES, URINE: NEGATIVE MG/DL
LEUKOCYTE ESTERASE, URINE: ABNORMAL
LYMPHOCYTES ABSOLUTE: 0.9 K/CU MM
LYMPHOCYTES RELATIVE PERCENT: 6.5 % (ref 24–44)
MAGNESIUM: 1.5 MG/DL (ref 1.8–2.4)
MCH RBC QN AUTO: 31.9 PG (ref 27–31)
MCHC RBC AUTO-ENTMCNC: 31.4 % (ref 32–36)
MCV RBC AUTO: 101.5 FL (ref 78–100)
MONOCYTES ABSOLUTE: 1.3 K/CU MM
MONOCYTES RELATIVE PERCENT: 9.2 % (ref 0–4)
MUCUS: ABNORMAL HPF
NITRITE URINE, QUANTITATIVE: NEGATIVE
NUCLEATED RBC %: 0 %
PDW BLD-RTO: 13.7 % (ref 11.7–14.9)
PH, URINE: 7 (ref 5–8)
PLATELET # BLD: 370 K/CU MM (ref 140–440)
PMV BLD AUTO: 10.1 FL (ref 7.5–11.1)
POTASSIUM SERPL-SCNC: 3.4 MMOL/L (ref 3.5–5.1)
PROTEIN UA: 100 MG/DL
PROTHROMBIN TIME: 13.8 SECONDS (ref 11.7–14.5)
RBC # BLD: 3.29 M/CU MM (ref 4.6–6.2)
RBC URINE: 121 /HPF (ref 0–3)
SEGMENTED NEUTROPHILS ABSOLUTE COUNT: 12 K/CU MM
SEGMENTED NEUTROPHILS RELATIVE PERCENT: 82.9 % (ref 36–66)
SODIUM BLD-SCNC: 139 MMOL/L (ref 135–145)
SPECIFIC GRAVITY UA: 1.01 (ref 1–1.03)
TOTAL IMMATURE NEUTOROPHIL: 0.09 K/CU MM
TOTAL NUCLEATED RBC: 0 K/CU MM
TOTAL PROTEIN: 4.1 GM/DL (ref 6.4–8.2)
TRICHOMONAS: ABNORMAL /HPF
UROBILINOGEN, URINE: 1 MG/DL (ref 0.2–1)
WBC # BLD: 14.5 K/CU MM (ref 4–10.5)
WBC UA: 215 /HPF (ref 0–2)

## 2023-10-10 PROCEDURE — 1200000000 HC SEMI PRIVATE

## 2023-10-10 PROCEDURE — 2580000003 HC RX 258: Performed by: STUDENT IN AN ORGANIZED HEALTH CARE EDUCATION/TRAINING PROGRAM

## 2023-10-10 PROCEDURE — 2500000003 HC RX 250 WO HCPCS: Performed by: RADIOLOGY

## 2023-10-10 PROCEDURE — 83735 ASSAY OF MAGNESIUM: CPT

## 2023-10-10 PROCEDURE — 87040 BLOOD CULTURE FOR BACTERIA: CPT

## 2023-10-10 PROCEDURE — 85025 COMPLETE CBC W/AUTO DIFF WBC: CPT

## 2023-10-10 PROCEDURE — 49450 REPLACE G/C TUBE PERC: CPT

## 2023-10-10 PROCEDURE — 87077 CULTURE AEROBIC IDENTIFY: CPT

## 2023-10-10 PROCEDURE — 6360000002 HC RX W HCPCS

## 2023-10-10 PROCEDURE — 6370000000 HC RX 637 (ALT 250 FOR IP): Performed by: STUDENT IN AN ORGANIZED HEALTH CARE EDUCATION/TRAINING PROGRAM

## 2023-10-10 PROCEDURE — 87186 SC STD MICRODIL/AGAR DIL: CPT

## 2023-10-10 PROCEDURE — 6360000002 HC RX W HCPCS: Performed by: STUDENT IN AN ORGANIZED HEALTH CARE EDUCATION/TRAINING PROGRAM

## 2023-10-10 PROCEDURE — 87181 SC STD AGAR DILUTION PER AGT: CPT

## 2023-10-10 PROCEDURE — 6360000002 HC RX W HCPCS: Performed by: RADIOLOGY

## 2023-10-10 PROCEDURE — 36415 COLL VENOUS BLD VENIPUNCTURE: CPT

## 2023-10-10 PROCEDURE — 0DH63UZ INSERTION OF FEEDING DEVICE INTO STOMACH, PERCUTANEOUS APPROACH: ICD-10-PCS | Performed by: SURGERY

## 2023-10-10 PROCEDURE — 76937 US GUIDE VASCULAR ACCESS: CPT

## 2023-10-10 PROCEDURE — 87086 URINE CULTURE/COLONY COUNT: CPT

## 2023-10-10 PROCEDURE — 6360000002 HC RX W HCPCS: Performed by: FAMILY MEDICINE

## 2023-10-10 PROCEDURE — 85610 PROTHROMBIN TIME: CPT

## 2023-10-10 PROCEDURE — 80053 COMPREHEN METABOLIC PANEL: CPT

## 2023-10-10 PROCEDURE — 82962 GLUCOSE BLOOD TEST: CPT

## 2023-10-10 PROCEDURE — 99254 IP/OBS CNSLTJ NEW/EST MOD 60: CPT | Performed by: SURGERY

## 2023-10-10 PROCEDURE — 2500000003 HC RX 250 WO HCPCS

## 2023-10-10 PROCEDURE — 81001 URINALYSIS AUTO W/SCOPE: CPT

## 2023-10-10 PROCEDURE — C1894 INTRO/SHEATH, NON-LASER: HCPCS

## 2023-10-10 PROCEDURE — 2580000003 HC RX 258

## 2023-10-10 RX ORDER — ONDANSETRON 2 MG/ML
4 INJECTION INTRAMUSCULAR; INTRAVENOUS EVERY 6 HOURS PRN
Status: DISCONTINUED | OUTPATIENT
Start: 2023-10-10 | End: 2023-10-13 | Stop reason: HOSPADM

## 2023-10-10 RX ORDER — LIDOCAINE HYDROCHLORIDE 10 MG/ML
INJECTION, SOLUTION EPIDURAL; INFILTRATION; INTRACAUDAL; PERINEURAL PRN
Status: COMPLETED | OUTPATIENT
Start: 2023-10-10 | End: 2023-10-10

## 2023-10-10 RX ORDER — ACETAMINOPHEN 325 MG/1
650 TABLET ORAL EVERY 6 HOURS PRN
Status: DISCONTINUED | OUTPATIENT
Start: 2023-10-10 | End: 2023-10-13 | Stop reason: HOSPADM

## 2023-10-10 RX ORDER — GLUCAGON 1 MG/ML
1 KIT INJECTION PRN
Status: DISCONTINUED | OUTPATIENT
Start: 2023-10-10 | End: 2023-10-13 | Stop reason: HOSPADM

## 2023-10-10 RX ORDER — FENTANYL CITRATE 50 UG/ML
INJECTION, SOLUTION INTRAMUSCULAR; INTRAVENOUS PRN
Status: COMPLETED | OUTPATIENT
Start: 2023-10-10 | End: 2023-10-10

## 2023-10-10 RX ORDER — POTASSIUM CHLORIDE 7.45 MG/ML
10 INJECTION INTRAVENOUS PRN
Status: DISCONTINUED | OUTPATIENT
Start: 2023-10-10 | End: 2023-10-10

## 2023-10-10 RX ORDER — SODIUM CHLORIDE, SODIUM LACTATE, POTASSIUM CHLORIDE, AND CALCIUM CHLORIDE .6; .31; .03; .02 G/100ML; G/100ML; G/100ML; G/100ML
1000 INJECTION, SOLUTION INTRAVENOUS ONCE
Status: COMPLETED | OUTPATIENT
Start: 2023-10-10 | End: 2023-10-10

## 2023-10-10 RX ORDER — DEXTROSE MONOHYDRATE 100 MG/ML
INJECTION, SOLUTION INTRAVENOUS CONTINUOUS PRN
Status: DISCONTINUED | OUTPATIENT
Start: 2023-10-10 | End: 2023-10-13 | Stop reason: HOSPADM

## 2023-10-10 RX ORDER — ONDANSETRON 4 MG/1
4 TABLET, ORALLY DISINTEGRATING ORAL EVERY 8 HOURS PRN
Status: DISCONTINUED | OUTPATIENT
Start: 2023-10-10 | End: 2023-10-13 | Stop reason: HOSPADM

## 2023-10-10 RX ORDER — ENOXAPARIN SODIUM 100 MG/ML
40 INJECTION SUBCUTANEOUS DAILY
Status: DISCONTINUED | OUTPATIENT
Start: 2023-10-10 | End: 2023-10-13 | Stop reason: HOSPADM

## 2023-10-10 RX ORDER — POTASSIUM CHLORIDE 7.45 MG/ML
10 INJECTION INTRAVENOUS
Status: DISPENSED | OUTPATIENT
Start: 2023-10-10 | End: 2023-10-10

## 2023-10-10 RX ORDER — MAGNESIUM SULFATE IN WATER 40 MG/ML
2000 INJECTION, SOLUTION INTRAVENOUS PRN
Status: DISCONTINUED | OUTPATIENT
Start: 2023-10-10 | End: 2023-10-10

## 2023-10-10 RX ORDER — SODIUM CHLORIDE 0.9 % (FLUSH) 0.9 %
5-40 SYRINGE (ML) INJECTION PRN
Status: DISCONTINUED | OUTPATIENT
Start: 2023-10-10 | End: 2023-10-13 | Stop reason: HOSPADM

## 2023-10-10 RX ORDER — ACETAMINOPHEN 650 MG/1
650 SUPPOSITORY RECTAL EVERY 6 HOURS PRN
Status: DISCONTINUED | OUTPATIENT
Start: 2023-10-10 | End: 2023-10-13 | Stop reason: HOSPADM

## 2023-10-10 RX ORDER — MAGNESIUM SULFATE 4 G/50ML
4000 INJECTION INTRAVENOUS ONCE
Status: COMPLETED | OUTPATIENT
Start: 2023-10-10 | End: 2023-10-11

## 2023-10-10 RX ORDER — POLYETHYLENE GLYCOL 3350 17 G/17G
17 POWDER, FOR SOLUTION ORAL DAILY PRN
Status: DISCONTINUED | OUTPATIENT
Start: 2023-10-10 | End: 2023-10-13 | Stop reason: HOSPADM

## 2023-10-10 RX ORDER — SODIUM CHLORIDE 9 MG/ML
INJECTION, SOLUTION INTRAVENOUS PRN
Status: DISCONTINUED | OUTPATIENT
Start: 2023-10-10 | End: 2023-10-13 | Stop reason: HOSPADM

## 2023-10-10 RX ORDER — POTASSIUM CHLORIDE 20 MEQ/1
40 TABLET, EXTENDED RELEASE ORAL PRN
Status: DISCONTINUED | OUTPATIENT
Start: 2023-10-10 | End: 2023-10-10

## 2023-10-10 RX ORDER — POTASSIUM CHLORIDE 7.45 MG/ML
10 INJECTION INTRAVENOUS
Status: DISCONTINUED | OUTPATIENT
Start: 2023-10-10 | End: 2023-10-10

## 2023-10-10 RX ORDER — CALCIUM GLUCONATE 20 MG/ML
2000 INJECTION, SOLUTION INTRAVENOUS ONCE
Status: COMPLETED | OUTPATIENT
Start: 2023-10-10 | End: 2023-10-10

## 2023-10-10 RX ORDER — SODIUM CHLORIDE 0.9 % (FLUSH) 0.9 %
5-40 SYRINGE (ML) INJECTION EVERY 12 HOURS SCHEDULED
Status: DISCONTINUED | OUTPATIENT
Start: 2023-10-10 | End: 2023-10-13 | Stop reason: HOSPADM

## 2023-10-10 RX ADMIN — POTASSIUM CHLORIDE 10 MEQ: 7.45 INJECTION INTRAVENOUS at 18:02

## 2023-10-10 RX ADMIN — MAGNESIUM SULFATE HEPTAHYDRATE 4000 MG: 80 INJECTION, SOLUTION INTRAVENOUS at 16:58

## 2023-10-10 RX ADMIN — POTASSIUM CHLORIDE 10 MEQ: 7.45 INJECTION INTRAVENOUS at 16:59

## 2023-10-10 RX ADMIN — ENOXAPARIN SODIUM 40 MG: 100 INJECTION SUBCUTANEOUS at 09:28

## 2023-10-10 RX ADMIN — MEROPENEM 1000 MG: 1 INJECTION, POWDER, FOR SOLUTION INTRAVENOUS at 12:16

## 2023-10-10 RX ADMIN — VANCOMYCIN HYDROCHLORIDE 1250 MG: 1.25 INJECTION, POWDER, LYOPHILIZED, FOR SOLUTION INTRAVENOUS at 12:58

## 2023-10-10 RX ADMIN — FENTANYL CITRATE 50 MCG: 50 INJECTION, SOLUTION INTRAMUSCULAR; INTRAVENOUS at 14:00

## 2023-10-10 RX ADMIN — NYSTATIN 500000 UNITS: 100000 SUSPENSION ORAL at 18:02

## 2023-10-10 RX ADMIN — LIDOCAINE HYDROCHLORIDE 10 ML: 10 INJECTION, SOLUTION EPIDURAL; INFILTRATION; INTRACAUDAL; PERINEURAL at 13:47

## 2023-10-10 RX ADMIN — SODIUM CHLORIDE: 9 INJECTION, SOLUTION INTRAVENOUS at 16:57

## 2023-10-10 RX ADMIN — CALCIUM GLUCONATE 2000 MG: 20 INJECTION, SOLUTION INTRAVENOUS at 07:09

## 2023-10-10 RX ADMIN — SODIUM CHLORIDE, POTASSIUM CHLORIDE, SODIUM LACTATE AND CALCIUM CHLORIDE 1000 ML: 600; 310; 30; 20 INJECTION, SOLUTION INTRAVENOUS at 01:39

## 2023-10-10 RX ADMIN — MEROPENEM 1000 MG: 1 INJECTION, POWDER, FOR SOLUTION INTRAVENOUS at 18:45

## 2023-10-10 RX ADMIN — CEFAZOLIN 2000 MG: 2 INJECTION, POWDER, FOR SOLUTION INTRAMUSCULAR; INTRAVENOUS at 01:37

## 2023-10-10 ASSESSMENT — ENCOUNTER SYMPTOMS
NAUSEA: 0
ABDOMINAL PAIN: 1
BACK PAIN: 0
VOICE CHANGE: 0
SINUS PAIN: 0
VOMITING: 0
COUGH: 0
TROUBLE SWALLOWING: 0
SHORTNESS OF BREATH: 0
SORE THROAT: 0
CONSTIPATION: 0
WHEEZING: 0
COLOR CHANGE: 1
CHEST TIGHTNESS: 0
DIARRHEA: 0
SINUS PRESSURE: 0

## 2023-10-10 ASSESSMENT — PAIN SCALES - PAIN ASSESSMENT IN ADVANCED DEMENTIA (PAINAD)
BREATHING: 0
TOTALSCORE: 0
NEGVOCALIZATION: 0
FACIALEXPRESSION: 0
CONSOLABILITY: 0
BODYLANGUAGE: 0

## 2023-10-10 NOTE — ED NOTES
Hospitalist perfect served at this time for critical calcium level of 6.8. Awaiting new orders.       Shira Mari RN  10/10/23 2019

## 2023-10-10 NOTE — ED NOTES
Lab called at this time for blood draw. Awaiting culture to be drawn before ATB given.       Abraham Chaney RN  10/10/23 2008

## 2023-10-10 NOTE — H&P
<0.010 08/07/2016 08:55 AM     BNP: No results for input(s): \"PROBNP\" in the last 72 hours. Lactic Acid: No results for input(s): \"LACTA\" in the last 72 hours. UA:No results found for: \"NITRU\", \"COLORU\", \"PHUR\", \"LABCAST\", \"WBCUA\", \"RBCUA\", \"MUCUS\", \"TRICHOMONAS\", \"YEAST\", \"BACTERIA\", \"CLARITYU\", \"SPECGRAV\", \"LEUKOCYTESUR\", \"UROBILINOGEN\", \"BILIRUBINUR\", \"BLOODU\", \"GLUCOSEU\", \"KETUA\", \"AMORPHOUS\"  Urine Cultures: No results found for: \"LABURIN\"  Blood Cultures: No results found for: \"BC\"  No results found for: \"BLOODCULT2\"  Organism: No results found for: \"ORG\"    Radiology results:  CT ABDOMEN PELVIS W IV CONTRAST Additional Contrast? Oral   Final Result   No PEG tube is identified. Mild soft tissue prominence to the left upper   quadrant anterior abdominal wall overlying the stomach with a tiny focus of   gas within the subcutaneous fat in this same region which could potentially   reflect sequela of a prior PEG tube with a persistent fistula/tract. Diffuse urinary bladder wall thickening of collapsed urinary bladder around a   indwelling Soto catheter. Findings could relate in part to under   distention. Superimposed nonspecific cystitis not excluded. Clinical and   laboratory correlation can be made. Nonobstructing left renal stones. Prominent stool in the rectum. Correlate clinically as to fecal impaction. CT HEAD WO CONTRAST   Preliminary Result   Bilateral brain stimulator wires extending to the thalami. There is artifact   over the brain parenchyma from the stimulator wires. Cerebral atrophy. Atherosclerotic disease of the cavernous carotid arteries. Mild chronic small vessel ischemic changes. No acute brain parenchymal   abnormality is appreciated. CT CERVICAL SPINE WO CONTRAST   Final Result   No acute abnormality of the cervical spine. XR PELVIS (1-2 VIEWS)   Final Result   1. No acute fracture of the pelvis are femoral heads identified.   Evaluation

## 2023-10-10 NOTE — ED NOTES
Pull tab bedside alarm placed on patient. Wife went home to check on dogs. Patient is a high fall risk.       Gonzalo Hollins RN  10/10/23 5253

## 2023-10-10 NOTE — PROGRESS NOTES
V2.0  [facility] [place] [facility] Hospitalist Progress Note      Name:  Sandie Neil /Age/Sex: 1950  (75 y.o. male)   MRN & CSN:  [id number] & [id number] Encounter Date/Time: 10/10/2023 1:46 PM EDT    Location:  [address] [place] [address]-A PCP: Michael Pfeiffer MD       Hospital Day: 2    Assessment and Plan:   Sandie Neil is a 75 y.o. male  who presents with PEG tube malfunction ([address])      Plan:    PEG Tube Malfunction   -Patient presents with PEG tube dislodgment from the fall; second episode since placement in September. CT abdomen with no PEG tube identified.  ED provider discussed case with general surgery who recommended patient be placed n.p.o. for surgical placement in the morning   -GS consult for PEG placement      Abdominal wall cellulitis   - Presented with fall and subsequent PEG malfunction   - On admit, hemodynamically stable, afebrile, leukocytosis of 16.   - Abdomin with cellulitis and purulent drainage at site of tube  - IV antibiotics: Vanc and Merrem (has MDRO history)     UTI  UA consistent with infection  - On antibiotics above  - f/u Culture restuls    Mood Disorder   - Continue zoloft     Parkinson's disease  - Has brain stimulator and PEG (needs to be replaced)    Diet Diet NPO   DVT Prophylaxis [x] Lovenox, []  Heparin, [] SCDs, [] Ambulation,    [] Eliquis, [] Xarelto  [] Coumadin [] other   Code Status Full Code   Disposition From: SNF  Expected Disposition: SNF  Estimated Date of Discharge: 2-3 days  Patient requires continued admission due to PEG tub placement and IV abx   Surrogate Decision Maker/ POA      Personally reviewed Lab Studies and Imaging     Discussed management of the case with general surgery who recommended new PEG placement    EKG interpreted personally and results no ST changes    Imaging that was interpreted personally includes CT scan and results PEG tube missing    Drugs that require monitoring for toxicity include Vancomycin and the method of monitoring was blood level radiation dose to as low as reasonably achievable. COMPARISON: None. HISTORY: ORDERING SYSTEM PROVIDED HISTORY: Fall TECHNOLOGIST PROVIDED HISTORY: Reason for exam:->Fall Decision Support Exception - unselect if not a suspected or confirmed emergency medical condition->Emergency Medical Condition (MA) Reason for Exam: Fall; Arm Injury, neck pain FINDINGS: BONES/ALIGNMENT: There is no acute fracture or traumatic malalignment. Grade 1 anterolisthesis of C7 on T1 DEGENERATIVE CHANGES: Multilevel moderate to severe degenerative change of the cervical spine. Solid osseous fusion of the C5 and C6 vertebral bodies. SOFT TISSUES: There is no prevertebral soft tissue swelling. No acute abnormality of the cervical spine. CT HEAD WO CONTRAST    Result Date: 10/9/2023  EXAMINATION: CT OF THE HEAD WITHOUT CONTRAST  10/9/2023 2:14 pm TECHNIQUE: CT of the head was performed without the administration of intravenous contrast. Automated exposure control, iterative reconstruction, and/or weight based adjustment of the mA/kV was utilized to reduce the radiation dose to as low as reasonably achievable. COMPARISON: 07/03/2017 HISTORY: ORDERING SYSTEM PROVIDED HISTORY: Fall TECHNOLOGIST PROVIDED HISTORY: Reason for exam:->Fall Has a \"code stroke\" or \"stroke alert\" been called? ->No Decision Support Exception - unselect if not a suspected or confirmed emergency medical condition->Emergency Medical Condition (MA) Reason for Exam: Fall, head pain Initial evaluation FINDINGS: BRAIN/VENTRICLES: The patient has bilateral brain stimulators with the tips near the thalami. There is artifact from the stimulators over the brain parenchyma. The ventricles and cisternal spaces are prominent consistent with cerebral atrophy. There is atherosclerotic calcification of the cavernous carotids. There are mild chronic small vessel ischemic changes. There is no midline shift or mass effect. No hemorrhage is identified in the brain parenchyma.

## 2023-10-10 NOTE — CARE COORDINATION
MCG criteria for cellulitis reviewed at this time, criteria supports Inpatient Admission.  ELADIO,RN/CM

## 2023-10-10 NOTE — ED NOTES
Report given to [address] [place] [address]. Care transferred at this time.       Gonzalo Hollins RN  10/10/23 8536

## 2023-10-10 NOTE — PROGRESS NOTES
Patient transported to [facility] with this RN. Vitals stable. Tolerated procedure well. Bedside report given.

## 2023-10-10 NOTE — CONSULTS
Department of GeneralSurgery   Surgical Service Dr. Philly Sharpe Note    Date of Consult: 10/10/23         Reason for Consult:  Disloged PEG tube  Requesting Physician:  ED    CHIEF COMPLAINT:  disloged PEG tube     History Obtained From:  spouse, electronic medical record    HISTORY OF PRESENT ILLNESS:                The patient is a 75 y.o. male who presents with PEG tube being pulled out earlier in the day. Initially had PEG tube placed last month which was removed due to surrounding infection. He had a subsequent PEG tube 6 days ago. It was functioning until it was pulled earlier in day. Patient is poor historian but able to answer some questions. Denies abdominal pain. Has history of aspiration pneumonia/dysphagia unable to take anything p.o. Past Medical History:    Past Medical History:   Diagnosis Date    Arthritis     generalized    Atrial fibrillation (HCC)     CAD (coronary artery disease)     Dilated aortic root (HCC)     Hyperlipidemia     Limited range of motion (ROM) of shoulder     right    Low back pain     Non-STEMI (non-ST elevated myocardial infarction) ([address]) 2016    Parkinson disease     S/P deep brain stimulator placement     Shoulder pain, right     Wears dentures        Past Surgical History:    Past Surgical History:   Procedure Laterality Date    BACK SURGERY      ? lumbar surgery?     BRAIN SURGERY Left 2/23/2021    REPLACEMENT OF LEFT DUAL CHANNEL DEEP BRAIN STIMULATOR BATTERY performed by Sallie Mujica MD at [address] Right     tear    SHOULDER SURGERY      right  x 3 with implant       Current Medications:   Current Facility-Administered Medications   Medication Dose Route Frequency Provider Last Rate Last Admin    sertraline (ZOLOFT) tablet 50 mg  50 mg Oral Daily Jamilah Dasilva MD        sodium chloride flush 0.9 % injection 5-40 mL  5-40 mL IntraVENous 2 times per day Jamilah Dasilva MD        sodium chloride flush 0.9 % injection 5-40 mL  5-40 mL Resp:       Temp:       TempSrc:       SpO2: 96% 97% 95% 94%       Physical Exam  General: No acute distress  Respiratory: Chest rise equal bilaterally  CV: Appears well perfused   Abdomen: Soft, nontender, nondistended, no rebound or guarding. Scaphoid abdomen. PEG tube sites x2. More lateral site with some drainage. DATA:    CBC:   Lab Results   Component Value Date/Time    WBC 14.5 10/10/2023 05:30 AM    RBC 3.29 10/10/2023 05:30 AM    HGB 10.5 10/10/2023 05:30 AM    HCT 33.4 10/10/2023 05:30 AM    .5 10/10/2023 05:30 AM    MCH 31.9 10/10/2023 05:30 AM    MCHC 31.4 10/10/2023 05:30 AM    RDW 13.7 10/10/2023 05:30 AM     10/10/2023 05:30 AM    MPV 10.1 10/10/2023 05:30 AM       IMPRESSION:        Patient Active Problem List:     PEG tube malfunction (HCC)      PEG tube dislodged    PLAN:      After initial discussion with ED physician a CT with p.o. contrast was ordered to rule out intra-abdominal abscess/gastric leak given recently placed PEG tube. Patient unable to have NG tube placed and unable to drink p.o. contrast therefore CT done without contrast and there was no pneumoperitoneum/intra-abdominal fluid collection. This a.m. I reviewed the CT with Dr. Agustina Voss who will attempt to place IR G-tube and if unable then I will proceed with PEG tube, timing to be determined.               Wellington De La Garza DO

## 2023-10-10 NOTE — ED NOTES
Care assumed at this time from [facility], [address] [place] [address].      Alivia Escoto RN  10/10/23 3958

## 2023-10-10 NOTE — PROGRESS NOTES
4 Eyes Skin Assessment     NAME:  Benny Copeland  YOB: 1950  MEDICAL RECORD NUMBER:  [id number]    The patient is being assessed for  Admission    I agree that at least one RN has performed a thorough Head to Toe Skin Assessment on the patient. ALL assessment sites listed below have been assessed. Areas assessed by both nurses:    Head, Face, Ears, Shoulders, Back, Chest, Arms, Elbows, Hands, Sacrum. Buttock, Coccyx, Ischium, Legs. Feet and Heels, and Under Medical Devices         Does the Patient have a Wound? Yes wound(s) were present on assessment.  LDA wound assessment was Initiated and completed by RN       Andrea Prevention initiated by RN: Yes  Wound Care Orders initiated by RN: yes    Pressure Injury (Stage 3,4, Unstageable, DTI, NWPT, and Complex wounds) if present, place Wound referral order by RN under : Yes    New Ostomies, if present place, Ostomy referral order under : No     Nurse 1 eSignature: Electronically signed by Kristel Linton LPN on 96/71/55 at 5:48 PM EDT    **SHARE this note so that the co-signing nurse can place an eSignature**    Nurse 2 eSignature: Electronically signed by Edwina Arnett LPN on 88/34/60 at 9:76 PM EDT

## 2023-10-10 NOTE — ED NOTES
ED TO INPATIENT SBAR HANDOFF    Patient Name: Con Bueno   :  1950  75 y.o. Preferred Name    Family/Caregiver Present yes   Restraints no   C-SSRS: Risk of Suicide: No Risk  Sitter no   Sepsis Risk Score Sepsis Risk Score: 1.4      Situation  Chief Complaint   Patient presents with    Fall    Arm Injury     Brief Description of Patient's Condition: Pt arrived to ED via EMS with c/o fall and PEG tube removal. Wife states that the facility told her he rolled over and pulled out the PEG tube but EMS was told it was an unwitnessed fall. IR was consulted for a replacement surgery. We attempted to place an NG per surgeon and were unsuccessful. Pt has a chronically displaced shoulder. Mental Status: disoriented and alert  Arrived from: nursing home    Imaging:   CT ABDOMEN PELVIS W IV CONTRAST Additional Contrast? Oral   Final Result   No PEG tube is identified. Mild soft tissue prominence to the left upper   quadrant anterior abdominal wall overlying the stomach with a tiny focus of   gas within the subcutaneous fat in this same region which could potentially   reflect sequela of a prior PEG tube with a persistent fistula/tract. Diffuse urinary bladder wall thickening of collapsed urinary bladder around a   indwelling Soto catheter. Findings could relate in part to under   distention. Superimposed nonspecific cystitis not excluded. Clinical and   laboratory correlation can be made. Nonobstructing left renal stones. Prominent stool in the rectum. Correlate clinically as to fecal impaction. CT HEAD WO CONTRAST   Preliminary Result   Bilateral brain stimulator wires extending to the thalami. There is artifact   over the brain parenchyma from the stimulator wires. Cerebral atrophy. Atherosclerotic disease of the cavernous carotid arteries. Mild chronic small vessel ischemic changes. No acute brain parenchymal   abnormality is appreciated.          CT CERVICAL SPINE WO

## 2023-10-11 PROBLEM — E43 SEVERE MALNUTRITION (HCC): Chronic | Status: ACTIVE | Noted: 2023-10-11

## 2023-10-11 LAB
ALBUMIN SERPL-MCNC: 2.9 GM/DL (ref 3.4–5)
ALP BLD-CCNC: 101 IU/L (ref 40–128)
ALT SERPL-CCNC: 15 U/L (ref 10–40)
ANION GAP SERPL CALCULATED.3IONS-SCNC: 11 MMOL/L (ref 4–16)
AST SERPL-CCNC: 16 IU/L (ref 15–37)
BASOPHILS ABSOLUTE: 0.1 K/CU MM
BASOPHILS RELATIVE PERCENT: 0.5 % (ref 0–1)
BILIRUB SERPL-MCNC: 0.4 MG/DL (ref 0–1)
BUN SERPL-MCNC: 18 MG/DL (ref 6–23)
CALCIUM SERPL-MCNC: 8.4 MG/DL (ref 8.3–10.6)
CHLORIDE BLD-SCNC: 103 MMOL/L (ref 99–110)
CO2: 21 MMOL/L (ref 21–32)
CREAT SERPL-MCNC: 0.5 MG/DL (ref 0.9–1.3)
DIFFERENTIAL TYPE: ABNORMAL
EOSINOPHILS ABSOLUTE: 0.1 K/CU MM
EOSINOPHILS RELATIVE PERCENT: 0.5 % (ref 0–3)
GFR SERPL CREATININE-BSD FRML MDRD: >60 ML/MIN/1.73M2
GLUCOSE SERPL-MCNC: 75 MG/DL (ref 70–99)
HCT VFR BLD CALC: 36.1 % (ref 42–52)
HEMOGLOBIN: 10.5 GM/DL (ref 13.5–18)
IMMATURE NEUTROPHIL %: 0.5 % (ref 0–0.43)
LYMPHOCYTES ABSOLUTE: 0.5 K/CU MM
LYMPHOCYTES RELATIVE PERCENT: 3.4 % (ref 24–44)
MCH RBC QN AUTO: 32 PG (ref 27–31)
MCHC RBC AUTO-ENTMCNC: 29.1 % (ref 32–36)
MCV RBC AUTO: 110.1 FL (ref 78–100)
MONOCYTES ABSOLUTE: 1.3 K/CU MM
MONOCYTES RELATIVE PERCENT: 8.9 % (ref 0–4)
NUCLEATED RBC %: 0 %
PDW BLD-RTO: 13.8 % (ref 11.7–14.9)
PLATELET # BLD: 299 K/CU MM (ref 140–440)
PMV BLD AUTO: 10.1 FL (ref 7.5–11.1)
POTASSIUM SERPL-SCNC: 5 MMOL/L (ref 3.5–5.1)
RBC # BLD: 3.28 M/CU MM (ref 4.6–6.2)
SEGMENTED NEUTROPHILS ABSOLUTE COUNT: 12.8 K/CU MM
SEGMENTED NEUTROPHILS RELATIVE PERCENT: 86.2 % (ref 36–66)
SODIUM BLD-SCNC: 135 MMOL/L (ref 135–145)
TOTAL IMMATURE NEUTOROPHIL: 0.07 K/CU MM
TOTAL NUCLEATED RBC: 0 K/CU MM
TOTAL PROTEIN: 5.3 GM/DL (ref 6.4–8.2)
WBC # BLD: 14.9 K/CU MM (ref 4–10.5)

## 2023-10-11 PROCEDURE — 1200000000 HC SEMI PRIVATE

## 2023-10-11 PROCEDURE — 94761 N-INVAS EAR/PLS OXIMETRY MLT: CPT

## 2023-10-11 PROCEDURE — 2580000003 HC RX 258: Performed by: STUDENT IN AN ORGANIZED HEALTH CARE EDUCATION/TRAINING PROGRAM

## 2023-10-11 PROCEDURE — 6360000002 HC RX W HCPCS: Performed by: STUDENT IN AN ORGANIZED HEALTH CARE EDUCATION/TRAINING PROGRAM

## 2023-10-11 PROCEDURE — 85025 COMPLETE CBC W/AUTO DIFF WBC: CPT

## 2023-10-11 PROCEDURE — 6370000000 HC RX 637 (ALT 250 FOR IP): Performed by: STUDENT IN AN ORGANIZED HEALTH CARE EDUCATION/TRAINING PROGRAM

## 2023-10-11 PROCEDURE — 99211 OFF/OP EST MAY X REQ PHY/QHP: CPT

## 2023-10-11 PROCEDURE — 80053 COMPREHEN METABOLIC PANEL: CPT

## 2023-10-11 PROCEDURE — 36415 COLL VENOUS BLD VENIPUNCTURE: CPT

## 2023-10-11 PROCEDURE — 2700000000 HC OXYGEN THERAPY PER DAY

## 2023-10-11 RX ORDER — POTASSIUM CHLORIDE 7.45 MG/ML
10 INJECTION INTRAVENOUS
Status: COMPLETED | OUTPATIENT
Start: 2023-10-11 | End: 2023-10-11

## 2023-10-11 RX ADMIN — NYSTATIN 500000 UNITS: 100000 SUSPENSION ORAL at 22:24

## 2023-10-11 RX ADMIN — POTASSIUM CHLORIDE 10 MEQ: 7.46 INJECTION, SOLUTION INTRAVENOUS at 03:14

## 2023-10-11 RX ADMIN — SODIUM CHLORIDE, PRESERVATIVE FREE 5 ML: 5 INJECTION INTRAVENOUS at 22:23

## 2023-10-11 RX ADMIN — NYSTATIN 500000 UNITS: 100000 SUSPENSION ORAL at 00:08

## 2023-10-11 RX ADMIN — MEROPENEM 1000 MG: 1 INJECTION, POWDER, FOR SOLUTION INTRAVENOUS at 03:11

## 2023-10-11 RX ADMIN — VANCOMYCIN HYDROCHLORIDE 1250 MG: 1.25 INJECTION, POWDER, LYOPHILIZED, FOR SOLUTION INTRAVENOUS at 17:37

## 2023-10-11 RX ADMIN — NYSTATIN 500000 UNITS: 100000 SUSPENSION ORAL at 11:47

## 2023-10-11 RX ADMIN — ENOXAPARIN SODIUM 40 MG: 100 INJECTION SUBCUTANEOUS at 11:47

## 2023-10-11 RX ADMIN — VANCOMYCIN HYDROCHLORIDE 1250 MG: 1.25 INJECTION, POWDER, LYOPHILIZED, FOR SOLUTION INTRAVENOUS at 02:59

## 2023-10-11 RX ADMIN — POTASSIUM CHLORIDE 10 MEQ: 7.46 INJECTION, SOLUTION INTRAVENOUS at 04:09

## 2023-10-11 RX ADMIN — SODIUM CHLORIDE, PRESERVATIVE FREE 10 ML: 5 INJECTION INTRAVENOUS at 02:25

## 2023-10-11 RX ADMIN — MEROPENEM 1000 MG: 1 INJECTION, POWDER, FOR SOLUTION INTRAVENOUS at 11:52

## 2023-10-11 ASSESSMENT — ENCOUNTER SYMPTOMS
DIARRHEA: 0
VOICE CHANGE: 0
SORE THROAT: 0
CONSTIPATION: 0
VOMITING: 0
SHORTNESS OF BREATH: 0
NAUSEA: 0
COUGH: 0
SINUS PAIN: 0
CHEST TIGHTNESS: 0
ABDOMINAL PAIN: 1
TROUBLE SWALLOWING: 0
SINUS PRESSURE: 0
COLOR CHANGE: 1
WHEEZING: 0
BACK PAIN: 0

## 2023-10-11 ASSESSMENT — PAIN SCALES - PAIN ASSESSMENT IN ADVANCED DEMENTIA (PAINAD)
FACIALEXPRESSION: 0
BODYLANGUAGE: 0
TOTALSCORE: 0
FACIALEXPRESSION: 0
BREATHING: 0
TOTALSCORE: 0
CONSOLABILITY: 0
NEGVOCALIZATION: 0
BREATHING: 0
BODYLANGUAGE: 0
NEGVOCALIZATION: 0
CONSOLABILITY: 0

## 2023-10-11 NOTE — PLAN OF CARE
Problem: Safety - Adult  Goal: Free from fall injury  Outcome: Progressing     Problem: Discharge Planning  Goal: Discharge to home or other facility with appropriate resources  Outcome: Progressing     Problem: Skin/Tissue Integrity  Goal: Absence of new skin breakdown  Description: 1. Monitor for areas of redness and/or skin breakdown  2. Assess vascular access sites hourly  3. Every 4-6 hours minimum:  Change oxygen saturation probe site  4. Every 4-6 hours:  If on nasal continuous positive airway pressure, respiratory therapy assess nares and determine need for appliance change or resting period.   Outcome: Progressing     Problem: Respiratory - Adult  Goal: Achieves optimal ventilation and oxygenation  Outcome: Progressing     Problem: Skin/Tissue Integrity - Adult  Goal: Skin integrity remains intact  Outcome: Progressing  Goal: Incisions, wounds, or drain sites healing without S/S of infection  Outcome: Progressing  Goal: Oral mucous membranes remain intact  Outcome: Progressing

## 2023-10-11 NOTE — PROGRESS NOTES
Assumed care of Patient @ 1900  Wife @ bedside  Patient loss IV access. During Shift change, IV site red & edematous. IV was placed by IV team. Orders placed by This RN for IV team consult to place new IV.

## 2023-10-11 NOTE — PROGRESS NOTES
V2.0  [facility] [place]      Name:  Wilfrido Black /Age/Sex: 1950  (75 y.o. male)   MRN & CSN:  [id number] & [id number] Encounter Date/Time: 10/11/2023 1:46 PM EDT    Location:  [address] [place] [address]-A PCP: Maru Dailey MD       Hospital Day: 3    Assessment and Plan:   Wilfrido Black is a 75 y.o. male  who presents with PEG tube malfunction ([address])      Plan:    PEG Tube Malfunction   -Patient presents with PEG tube dislodgment from the fall; second episode since placement in September. CT abdomen with no PEG tube identified.  ED provider discussed case with general surgery who recommended patient be placed n.p.o. for surgical placement in the morning   -new PEG placed  - will resume tube feeds after 24 hrs     Abdominal wall cellulitis   - Presented with fall and subsequent PEG malfunction   - On admit, hemodynamically stable, afebrile, leukocytosis of 16.   - Abdomin with cellulitis and purulent drainage at site of tube  - IV antibiotics: Vanc and Merrem (has MDRO history)     UTI  UA consistent with infection  - On antibiotics above  - f/u Culture restuls    Mood Disorder   - Continue zoloft     Parkinson's disease  - Has brain stimulator and PEG (needs to be replaced)    Diet Diet NPO   DVT Prophylaxis [x] Lovenox, []  Heparin, [] SCDs, [] Ambulation,    [] Eliquis, [] Xarelto  [] Coumadin [] other   Code Status Full Code   Disposition From: [facility]  Expected Disposition: [facility]  Estimated Date of Discharge:1-2 days  Patient requires continued admission due to PEG tub placement and IV abx and urine culture results   Surrogate Decision Maker/ POA      Personally reviewed Lab Studies and Imaging     Discussed management of the case with general surgery who recommended new PEG placement    EKG interpreted personally and results no ST changes    Imaging that was interpreted personally includes CT scan and results PEG tube missing    Drugs that require monitoring for toxicity include Vancomycin and the method of reduce the radiation dose to as low as reasonably achievable. COMPARISON: 07/03/2017 HISTORY: ORDERING SYSTEM PROVIDED HISTORY: Fall TECHNOLOGIST PROVIDED HISTORY: Reason for exam:->Fall Has a \"code stroke\" or \"stroke alert\" been called? ->No Decision Support Exception - unselect if not a suspected or confirmed emergency medical condition->Emergency Medical Condition (MA) Reason for Exam: Fall, head pain Initial evaluation FINDINGS: BRAIN/VENTRICLES: The patient has bilateral brain stimulators with the tips near the thalami. There is artifact from the stimulators over the brain parenchyma. The ventricles and cisternal spaces are prominent consistent with cerebral atrophy. There is atherosclerotic calcification of the cavernous carotids. There are mild chronic small vessel ischemic changes. There is no midline shift or mass effect. No hemorrhage is identified in the brain parenchyma. ORBITS: The visualized portion of the orbits demonstrate no acute abnormality. SINUSES: There is mucoperiosteal thickening of the right maxillary sinus. The remainder of the sinuses are clear. There is partial opacification of the right mastoid air cells. The left mastoid air cells are clear. SOFT TISSUES/SKULL:  No acute abnormality of the visualized skull or soft tissues. Bilateral brain stimulator wires extending to the thalami. There is artifact over the brain parenchyma from the stimulator wires. Cerebral atrophy. Atherosclerotic disease of the cavernous carotid arteries. Mild chronic small vessel ischemic changes. No acute brain parenchymal abnormality is appreciated. XR PELVIS (1-2 VIEWS)    Result Date: 10/9/2023  EXAMINATION: ONE XRAY VIEW OF THE PELVIS 10/9/2023 2:26 pm COMPARISON: None. HISTORY: CT abdomen pelvis May 6, 2011 FINDINGS: Single view of the pelvis was reviewed. Limited evaluation of the proximal right and left femur due to overlying osseous structures of the bilateral hands.   No pelvic fracture

## 2023-10-11 NOTE — CONSULTS
Comprehensive Nutrition Assessment    Type and Reason for Visit:  Initial, Consult (TF order and mgt and Andrea Score)     Nutrition Recommendations/Plan:   Monitor and correct lytes (Mg, K, Phos) prior to EN goal rate  EN Recommendation: Jevity 1.2 Marlon formula at goal rate of 65 ml/hr to provide approx. 1872 kcals, 87 gm protein. Trial standard fluids at 30 Q 4 H, may increase as needed. Start EN once PEG placed after 24 hours+ or per general surgery/MD approva     Malnutrition Assessment:  Malnutrition Status:  Severe malnutrition (10/11/23 1422)    Context:  Chronic Illness     Findings of the 6 clinical characteristics of malnutrition:  Energy Intake:  75% or less estimated energy requirements for 1 month or longer  Weight Loss:  Unable to assess     Body Fat Loss:  Severe body fat loss Orbital, Triceps, Fat Overlying Ribs, Buccal region   Muscle Mass Loss:  Severe muscle mass loss Temples (temporalis), Clavicles (pectoralis & deltoids), Thigh (quadraceps), Calf (gastrocnemius), Scapula (trapezius)  Fluid Accumulation:  Unable to assess Extremities   Strength:  Not Performed    Nutrition Assessment:    Admitted with PEG tube malfunction, second admission s/s above since September 2023 per MD. Pt from SNF, hx of dysphagia with silent aspiration, G tube placement with EN of 70 ml/hr with 120 Q 4 H flushes per [facility] notes, Parkinson's Disease. Pt currently NPO. Awaiting G tube replacement. No TF ordered at visit. Spoke with pt, soft in speech, last BM 10/10 per pt, UBW of 160#, last po intake in September prior to PEG tube, reports receiving EN twice daily?  at bedside, no family or loved ones. Performed NFPE, severe wasting noted to all extremities except for hands and . Meets criteria for malnutrition. Will start EN as able. Follow as high nutrition risk.     Nutrition Related Findings:    Mg 1.5 , K 5.0, Hgb 10.5, GFR >60  +nystatin, vancomycin Wound Type: Pressure Injury, Stage I       Current Data, Chewing or Swallowing, Meal Time Behavior, Nutrition Focused Physical Findings, Weight    Discharge Planning:    Enteral Nutrition     Gwendolyn Kendall, [address] [facility] [place]   Contact: [phone]

## 2023-10-11 NOTE — PROGRESS NOTES
10/11/23 1440   Encounter Summary   Encounter Overview/Reason  Initial Encounter   Service Provided For: Patient and family together   Referral/Consult From: [facility]   Support System Spouse   Last Encounter  10/11/23  (short visit.  Room service)   Complexity of Encounter Low   Begin Time 1433   End Time  1438   Total Time Calculated 5 min   Spiritual/Emotional needs   Type Spiritual Support   Grief, Loss, and Adjustments   Type Adjustment to illness   Assessment/Intervention/Outcome   Assessment Coping   Intervention Sustaining Presence/Ministry of presence   Outcome Coping   Plan and Referrals   Plan/Referrals No future visits requested

## 2023-10-11 NOTE — CARE COORDINATION
10/11/23 1525   Service Assessment   Cognition Alert  (non verbal but attempted to talk, but not able to get any sound out.)   History Provided By Medical Record;Spouse   Primary Caregiver Spouse   Prior Functional Level Assistance with the following:;Bathing;Dressing; Toileting;Feeding;Mobility   Current Functional Level Assistance with the following:;Bathing;Dressing; Toileting;Feeding;Mobility   Can patient return to prior living arrangement Unknown at present  (Came from [facility])   Ability to make needs known: Poor   Family able to assist with home care needs: Yes  (but wife feels too weak to come home.)   Financial Resources  ([facility])   [employer] ECF/Home Care   Social/Functional History   Lives With Spouse   Type of Home House   Active  No     Reviewed chart, discussed in [address] and spoke with wife, pt not able to talk. Pt is normally from home with wife but has been in and out of hospitals and SNU for last 2 months and has lots over 50 lbs. Was at [facility], [facility] [address] [place] [address] E, [facility], [facility]  then [facility] , now [facility]. At discharge wife states plan will be to return to [facility] for Short term rehab with plan to return home with her once medically able. Called spoke with Andrea Hylton at [facility] confirmed pt if from them and their under Medicare so no precert needed to return.     CM will continue to follow

## 2023-10-11 NOTE — CONSULTS
[facility] Continence Nurse  Consult Note       Benny Chiia Min  AGE: 75 y.o. GENDER: male  : 1950  TODAY'S DATE:  10/11/2023    Subjective:     Reason for Evaluation and Assessment: wound care sue Campbell is a 75 y.o. male referred by:   [x] Physician  [] Nursing  [] Other:     Wound Identification:  Wound Type: pressure  Contributing Factors: chronic pressure, decreased mobility, and malnutrition        PAST MEDICAL HISTORY        Diagnosis Date    Arthritis     generalized    Atrial fibrillation (HCC)     CAD (coronary artery disease)     Dilated aortic root (HCC)     Hyperlipidemia     Limited range of motion (ROM) of shoulder     right    Low back pain     Non-STEMI (non-ST elevated myocardial infarction) ([address]) 2016    Parkinson disease     S/P deep brain stimulator placement     Shoulder pain, right     Wears dentures        PAST SURGICAL HISTORY    Past Surgical History:   Procedure Laterality Date    BACK SURGERY      ? lumbar surgery? BRAIN SURGERY Left 2021    REPLACEMENT OF LEFT DUAL CHANNEL DEEP BRAIN STIMULATOR BATTERY performed by Audi French MD at [address] Right     tear    SHOULDER SURGERY      right  x 3 with implant       FAMILY HISTORY    History reviewed. No pertinent family history. SOCIAL HISTORY    Social History     Tobacco Use    Smoking status: Former     Types: Cigarettes     Quit date:      Years since quittin.7    Smokeless tobacco: Never   Vaping Use    Vaping Use: Never used   Substance Use Topics    Alcohol use:  Yes     Alcohol/week: 1.0 standard drink of alcohol     Types: 1 Cans of beer per week     Comment: daily    Drug use: No       ALLERGIES    Allergies   Allergen Reactions    Aspirin      ulcers    Celebrex [Celecoxib] Other (See Comments)     Bleeding      Flexeril [Cyclobenzaprine]      Wife does not remember reaction       MEDICATIONS    No current facility-administered medications on file prior to

## 2023-10-12 LAB
ALBUMIN SERPL-MCNC: 2.8 GM/DL (ref 3.4–5)
ALP BLD-CCNC: 99 IU/L (ref 40–128)
ALT SERPL-CCNC: 14 U/L (ref 10–40)
ANION GAP SERPL CALCULATED.3IONS-SCNC: 8 MMOL/L (ref 4–16)
AST SERPL-CCNC: 13 IU/L (ref 15–37)
BASOPHILS ABSOLUTE: 0.1 K/CU MM
BASOPHILS RELATIVE PERCENT: 0.4 % (ref 0–1)
BILIRUB SERPL-MCNC: 0.3 MG/DL (ref 0–1)
BUN SERPL-MCNC: 16 MG/DL (ref 6–23)
CALCIUM SERPL-MCNC: 8.7 MG/DL (ref 8.3–10.6)
CHLORIDE BLD-SCNC: 103 MMOL/L (ref 99–110)
CO2: 27 MMOL/L (ref 21–32)
CREAT SERPL-MCNC: 0.4 MG/DL (ref 0.9–1.3)
DIFFERENTIAL TYPE: ABNORMAL
DOSE AMOUNT: NORMAL
DOSE TIME: NORMAL
EOSINOPHILS ABSOLUTE: 0.1 K/CU MM
EOSINOPHILS RELATIVE PERCENT: 0.3 % (ref 0–3)
GFR SERPL CREATININE-BSD FRML MDRD: >60 ML/MIN/1.73M2
GLUCOSE SERPL-MCNC: 85 MG/DL (ref 70–99)
HCT VFR BLD CALC: 30.5 % (ref 42–52)
HEMOGLOBIN: 9.8 GM/DL (ref 13.5–18)
IMMATURE NEUTROPHIL %: 0.4 % (ref 0–0.43)
LYMPHOCYTES ABSOLUTE: 0.6 K/CU MM
LYMPHOCYTES RELATIVE PERCENT: 3.5 % (ref 24–44)
MCH RBC QN AUTO: 31.6 PG (ref 27–31)
MCHC RBC AUTO-ENTMCNC: 32.1 % (ref 32–36)
MCV RBC AUTO: 98.4 FL (ref 78–100)
MONOCYTES ABSOLUTE: 1.7 K/CU MM
MONOCYTES RELATIVE PERCENT: 9.1 % (ref 0–4)
NUCLEATED RBC %: 0 %
PDW BLD-RTO: 14 % (ref 11.7–14.9)
PLATELET # BLD: 414 K/CU MM (ref 140–440)
PMV BLD AUTO: 9.5 FL (ref 7.5–11.1)
POTASSIUM SERPL-SCNC: 4.2 MMOL/L (ref 3.5–5.1)
RBC # BLD: 3.1 M/CU MM (ref 4.6–6.2)
SEGMENTED NEUTROPHILS ABSOLUTE COUNT: 15.9 K/CU MM
SEGMENTED NEUTROPHILS RELATIVE PERCENT: 86.3 % (ref 36–66)
SODIUM BLD-SCNC: 138 MMOL/L (ref 135–145)
TOTAL IMMATURE NEUTOROPHIL: 0.08 K/CU MM
TOTAL NUCLEATED RBC: 0 K/CU MM
TOTAL PROTEIN: 4.9 GM/DL (ref 6.4–8.2)
VANCOMYCIN RANDOM: 12.6 UG/ML
WBC # BLD: 18.4 K/CU MM (ref 4–10.5)

## 2023-10-12 PROCEDURE — 36415 COLL VENOUS BLD VENIPUNCTURE: CPT

## 2023-10-12 PROCEDURE — 6370000000 HC RX 637 (ALT 250 FOR IP): Performed by: STUDENT IN AN ORGANIZED HEALTH CARE EDUCATION/TRAINING PROGRAM

## 2023-10-12 PROCEDURE — 6360000002 HC RX W HCPCS: Performed by: STUDENT IN AN ORGANIZED HEALTH CARE EDUCATION/TRAINING PROGRAM

## 2023-10-12 PROCEDURE — 80053 COMPREHEN METABOLIC PANEL: CPT

## 2023-10-12 PROCEDURE — 2580000003 HC RX 258: Performed by: STUDENT IN AN ORGANIZED HEALTH CARE EDUCATION/TRAINING PROGRAM

## 2023-10-12 PROCEDURE — 1200000000 HC SEMI PRIVATE

## 2023-10-12 PROCEDURE — 94761 N-INVAS EAR/PLS OXIMETRY MLT: CPT

## 2023-10-12 PROCEDURE — 2700000000 HC OXYGEN THERAPY PER DAY

## 2023-10-12 PROCEDURE — 80202 ASSAY OF VANCOMYCIN: CPT

## 2023-10-12 PROCEDURE — 85025 COMPLETE CBC W/AUTO DIFF WBC: CPT

## 2023-10-12 RX ADMIN — NYSTATIN 500000 UNITS: 100000 SUSPENSION ORAL at 09:42

## 2023-10-12 RX ADMIN — ENOXAPARIN SODIUM 40 MG: 100 INJECTION SUBCUTANEOUS at 09:42

## 2023-10-12 RX ADMIN — SODIUM CHLORIDE 25 ML/HR: 9 INJECTION, SOLUTION INTRAVENOUS at 09:51

## 2023-10-12 RX ADMIN — NYSTATIN 500000 UNITS: 100000 SUSPENSION ORAL at 22:30

## 2023-10-12 RX ADMIN — SODIUM CHLORIDE, PRESERVATIVE FREE 10 ML: 5 INJECTION INTRAVENOUS at 09:46

## 2023-10-12 RX ADMIN — VANCOMYCIN HYDROCHLORIDE 1250 MG: 1.25 INJECTION, POWDER, LYOPHILIZED, FOR SOLUTION INTRAVENOUS at 04:01

## 2023-10-12 RX ADMIN — MEROPENEM 1000 MG: 1 INJECTION, POWDER, FOR SOLUTION INTRAVENOUS at 00:20

## 2023-10-12 RX ADMIN — SERTRALINE HYDROCHLORIDE 50 MG: 50 TABLET ORAL at 09:42

## 2023-10-12 RX ADMIN — MEROPENEM 1000 MG: 1 INJECTION, POWDER, FOR SOLUTION INTRAVENOUS at 09:53

## 2023-10-12 RX ADMIN — SODIUM CHLORIDE, PRESERVATIVE FREE 10 ML: 5 INJECTION INTRAVENOUS at 22:29

## 2023-10-12 ASSESSMENT — ENCOUNTER SYMPTOMS
COLOR CHANGE: 1
VOMITING: 0
SINUS PAIN: 0
SORE THROAT: 0
CONSTIPATION: 0
NAUSEA: 0
BACK PAIN: 0
CHEST TIGHTNESS: 0
COUGH: 0
ABDOMINAL PAIN: 1
TROUBLE SWALLOWING: 0
DIARRHEA: 0
SINUS PRESSURE: 0
SHORTNESS OF BREATH: 0
WHEEZING: 0
VOICE CHANGE: 0

## 2023-10-12 ASSESSMENT — PAIN SCALES - PAIN ASSESSMENT IN ADVANCED DEMENTIA (PAINAD)
BREATHING: 0
TOTALSCORE: 0
BODYLANGUAGE: 0
CONSOLABILITY: 0
NEGVOCALIZATION: 0
BODYLANGUAGE: 0
BREATHING: 0
CONSOLABILITY: 0
FACIALEXPRESSION: 0
FACIALEXPRESSION: 0
TOTALSCORE: 0
NEGVOCALIZATION: 0

## 2023-10-12 NOTE — CARE COORDINATION
Chart reviewed and discussed in IDR, awaiting urine cult sensi results. Plan remains to return to [facility] once medically ready. Tolerating G Tube feedings. Lea Stack from [facility] no prior auth to return . CM will continue to follow. 1620 Received call from Lea Stack at [facility] pt is in Co pay days with Medicare. Pt/family can not afford the co pays so they are working [employer], should know more tomorrow. CM will continue to follow.

## 2023-10-12 NOTE — PROGRESS NOTES
V2.0  [facility] Hospitalist Progress Note      Name:  Pawel Navarro /Age/Sex: 1950  (75 y.o. male)   MRN & CSN:  [id number] & [id number] Encounter Date/Time: 10/12/2023 1:46 PM EDT    Location:  [address] [place] [address]-A PCP: Ac Sawyer MD       Hospital Day: 4    Assessment and Plan:   Pawel Navarro is a 75 y.o. male  who presents with PEG tube malfunction ([address])      Plan:    PEG Tube Malfunction   -Patient presents with PEG tube dislodgment from the fall; second episode since placement in September. CT abdomen with no PEG tube identified. ED provider discussed case with general surgery who recommended patient be placed n.p.o. for surgical placement in the morning   -new PEG placed  - will resume tube feeds after 24 hrs: Tolerating     UTI  UA consistent with infection  - On antibiotics: Vancomycin plus meropenem  - f/u Culture results    Abdominal wall cellulitis   - Presented with fall and subsequent PEG malfunction   - On admit, hemodynamically stable, afebrile, leukocytosis of 16.   - Abdomin with small area of cellulitis around where the PEG tube was. -Discussed with general surgery and will concern for bad infection at the site. Will ensure coverage based on his UTI with antibiotics.        Mood Disorder   - Continue zoloft     Parkinson's disease  - Has brain stimulator and PEG (needs to be replaced)    Diet Diet NPO  ADULT TUBE FEEDING; PEG; Other Tube Feeding (specify); Jevity 1.2 Marlon; Continuous; 15; Yes; 10; Q 4 hours; 65; 30; Q 4 hours   DVT Prophylaxis [x] Lovenox, []  Heparin, [] SCDs, [] Ambulation,    [] Eliquis, [] Xarelto  [] Coumadin [] other   Code Status Full Code   Disposition From: SNF  Expected Disposition: SNF  Estimated Date of Discharge:1-2 days  Patient requires continued admission due to PEG tub placement and IV abx and urine culture results   Surrogate Decision Maker/ POA      Personally reviewed Lab Studies and Imaging     Discussed management of the case with general surgery who Value Ref Range    Sodium 138 135 - 145 MMOL/L    Potassium 4.2 3.5 - 5.1 MMOL/L    Chloride 103 99 - 110 mMol/L    CO2 27 21 - 32 MMOL/L    Anion Gap 8 4 - 16    Glucose 85 70 - 99 MG/DL    BUN 16 6 - 23 MG/DL    Creatinine 0.4 (L) 0.9 - 1.3 MG/DL    Est, Glom Filt Rate >60 >60 mL/min/1.73m2    Calcium 8.7 8.3 - 10.6 MG/DL    Total Protein 4.9 (L) 6.4 - 8.2 GM/DL    Albumin 2.8 (L) 3.4 - 5.0 GM/DL    Total Bilirubin 0.3 0.0 - 1.0 MG/DL    Alkaline Phosphatase 99 40 - 128 IU/L    ALT 14 10 - 40 U/L    AST 13 (L) 15 - 37 IU/L   CBC with Auto Differential    Collection Time: 10/12/23  3:20 AM   Result Value Ref Range    WBC 18.4 (H) 4.0 - 10.5 K/CU MM    RBC 3.10 (L) 4.6 - 6.2 M/CU MM    Hemoglobin 9.8 (L) 13.5 - 18.0 GM/DL    Hematocrit 30.5 (L) 42 - 52 %    MCV 98.4 78 - 100 FL    MCH 31.6 (H) 27 - 31 PG    MCHC 32.1 32.0 - 36.0 %    RDW 14.0 11.7 - 14.9 %    Platelets 864 014 - 988 K/CU MM    MPV 9.5 7.5 - 11.1 FL    Differential Type AUTOMATED DIFFERENTIAL     Segs Relative 86.3 (H) 36 - 66 %    Lymphocytes % 3.5 (L) 24 - 44 %    Monocytes % 9.1 (H) 0 - 4 %    Eosinophils % 0.3 0 - 3 %    Basophils % 0.4 0 - 1 %    Segs Absolute 15.9 K/CU MM    Lymphocytes Absolute 0.6 K/CU MM    Monocytes Absolute 1.7 K/CU MM    Eosinophils Absolute 0.1 K/CU MM    Basophils Absolute 0.1 K/CU MM    Nucleated RBC % 0.0 %    Total Nucleated RBC 0.0 K/CU MM    Total Immature Neutrophil 0.08 K/CU MM    Immature Neutrophil % 0.4 0 - 0.43 %   Vancomycin Level, Random    Collection Time: 10/12/23  3:20 AM   Result Value Ref Range    Vancomycin Rm 12.6 UG/ML    DOSE AMOUNT DOSE AMT.  GIVEN - 1250     DOSE TIME DOSE TIME GIVEN - 2200         Imaging/Diagnostics Last 24 Hours   CT ABDOMEN PELVIS W IV CONTRAST Additional Contrast? Oral    Result Date: 10/9/2023  EXAMINATION: CT OF THE ABDOMEN AND PELVIS WITH CONTRAST 10/9/2023 7:08 pm TECHNIQUE: CT of the abdomen and pelvis was performed with the administration of intravenous contrast.

## 2023-10-13 VITALS
HEART RATE: 56 BPM | TEMPERATURE: 97.6 F | BODY MASS INDEX: 19.78 KG/M2 | RESPIRATION RATE: 18 BRPM | DIASTOLIC BLOOD PRESSURE: 62 MMHG | SYSTOLIC BLOOD PRESSURE: 109 MMHG | HEIGHT: 67 IN | OXYGEN SATURATION: 98 % | WEIGHT: 126 LBS

## 2023-10-13 LAB
ANION GAP SERPL CALCULATED.3IONS-SCNC: 7 MMOL/L (ref 4–16)
BASOPHILS ABSOLUTE: 0.1 K/CU MM
BASOPHILS RELATIVE PERCENT: 0.6 % (ref 0–1)
BUN SERPL-MCNC: 14 MG/DL (ref 6–23)
CALCIUM SERPL-MCNC: 8.4 MG/DL (ref 8.3–10.6)
CHLORIDE BLD-SCNC: 103 MMOL/L (ref 99–110)
CO2: 26 MMOL/L (ref 21–32)
CREAT SERPL-MCNC: 0.3 MG/DL (ref 0.9–1.3)
CULTURE: ABNORMAL
CULTURE: ABNORMAL
DIFFERENTIAL TYPE: ABNORMAL
EOSINOPHILS ABSOLUTE: 0.1 K/CU MM
EOSINOPHILS RELATIVE PERCENT: 0.6 % (ref 0–3)
GFR SERPL CREATININE-BSD FRML MDRD: >60 ML/MIN/1.73M2
GLUCOSE SERPL-MCNC: 110 MG/DL (ref 70–99)
HCT VFR BLD CALC: 33.3 % (ref 42–52)
HEMOGLOBIN: 9.8 GM/DL (ref 13.5–18)
IMMATURE NEUTROPHIL %: 0.6 % (ref 0–0.43)
LYMPHOCYTES ABSOLUTE: 0.8 K/CU MM
LYMPHOCYTES RELATIVE PERCENT: 5.9 % (ref 24–44)
Lab: ABNORMAL
MCH RBC QN AUTO: 32.2 PG (ref 27–31)
MCHC RBC AUTO-ENTMCNC: 29.4 % (ref 32–36)
MCV RBC AUTO: 109.5 FL (ref 78–100)
MONOCYTES ABSOLUTE: 1.3 K/CU MM
MONOCYTES RELATIVE PERCENT: 9.3 % (ref 0–4)
NUCLEATED RBC %: 0 %
PDW BLD-RTO: 14 % (ref 11.7–14.9)
PLATELET # BLD: 385 K/CU MM (ref 140–440)
PMV BLD AUTO: 10.4 FL (ref 7.5–11.1)
POTASSIUM SERPL-SCNC: 4.2 MMOL/L (ref 3.5–5.1)
RBC # BLD: 3.04 M/CU MM (ref 4.6–6.2)
SEGMENTED NEUTROPHILS ABSOLUTE COUNT: 11.9 K/CU MM
SEGMENTED NEUTROPHILS RELATIVE PERCENT: 83 % (ref 36–66)
SODIUM BLD-SCNC: 136 MMOL/L (ref 135–145)
SPECIMEN: ABNORMAL
TOTAL IMMATURE NEUTOROPHIL: 0.09 K/CU MM
TOTAL NUCLEATED RBC: 0 K/CU MM
WBC # BLD: 14.4 K/CU MM (ref 4–10.5)

## 2023-10-13 PROCEDURE — 6360000002 HC RX W HCPCS: Performed by: STUDENT IN AN ORGANIZED HEALTH CARE EDUCATION/TRAINING PROGRAM

## 2023-10-13 PROCEDURE — 6370000000 HC RX 637 (ALT 250 FOR IP): Performed by: STUDENT IN AN ORGANIZED HEALTH CARE EDUCATION/TRAINING PROGRAM

## 2023-10-13 PROCEDURE — 85025 COMPLETE CBC W/AUTO DIFF WBC: CPT

## 2023-10-13 PROCEDURE — 36415 COLL VENOUS BLD VENIPUNCTURE: CPT

## 2023-10-13 PROCEDURE — 2700000000 HC OXYGEN THERAPY PER DAY

## 2023-10-13 PROCEDURE — 2580000003 HC RX 258: Performed by: STUDENT IN AN ORGANIZED HEALTH CARE EDUCATION/TRAINING PROGRAM

## 2023-10-13 PROCEDURE — 94761 N-INVAS EAR/PLS OXIMETRY MLT: CPT

## 2023-10-13 PROCEDURE — 80048 BASIC METABOLIC PNL TOTAL CA: CPT

## 2023-10-13 RX ORDER — CIPROFLOXACIN 500 MG/1
500 TABLET, FILM COATED ORAL 2 TIMES DAILY
Qty: 14 TABLET | Refills: 0 | Status: SHIPPED | OUTPATIENT
Start: 2023-10-13 | End: 2023-10-20

## 2023-10-13 RX ADMIN — ENOXAPARIN SODIUM 40 MG: 100 INJECTION SUBCUTANEOUS at 10:48

## 2023-10-13 RX ADMIN — SODIUM CHLORIDE, PRESERVATIVE FREE 10 ML: 5 INJECTION INTRAVENOUS at 10:48

## 2023-10-13 RX ADMIN — MEROPENEM 1000 MG: 1 INJECTION, POWDER, FOR SOLUTION INTRAVENOUS at 10:47

## 2023-10-13 RX ADMIN — SERTRALINE HYDROCHLORIDE 50 MG: 50 TABLET ORAL at 10:48

## 2023-10-13 RX ADMIN — NYSTATIN 500000 UNITS: 100000 SUSPENSION ORAL at 10:48

## 2023-10-13 RX ADMIN — VANCOMYCIN HYDROCHLORIDE 750 MG: 750 INJECTION, POWDER, LYOPHILIZED, FOR SOLUTION INTRAVENOUS at 03:12

## 2023-10-13 RX ADMIN — MEROPENEM 1000 MG: 1 INJECTION, POWDER, FOR SOLUTION INTRAVENOUS at 00:41

## 2023-10-13 NOTE — DISCHARGE SUMMARY
V2.0  Discharge Summary    Name:  Sacha Ahuja /Age/Sex: 1950 (75 y.o. male)   Admit Date: 10/9/2023  Discharge Date: 10/13/23    MRN & CSN:  [id number] & [id number] Encounter Date and Time 10/13/23 12:48 PM EDT    Attending:  Lenny Gan,* Discharging Provider: Lenny Gan MD       Hospital Course:     Brief HPI: Sacha Ahuja is a 75 y.o. male  with a PMHx of Parkinson's s/p brain stimulator and PEG tube due to malnutrition and recurrent aspiration pneumonia who presented to the ED with fall and PEG tube dislodgement. Per wife, patient was in inpatient rehab at which he fell out of bed and PEG tube came out. The wife reports he has advanced parkinsons with concerns for aspiration PNA at which a PEG tube was placed. She states this is the second time his PEG tube has been placed with the last episode being 6 days ago. Brief Problem Based Course:     PEG Tube Malfunction   Patient presents with PEG tube dislodgment from the fall; second episode since placement in September. CT abdomen with no PEG tube identified. New PEG placed by IR. Tolerated tube feeding prior to DC.     UTI  UA consistent with infection. Grew Pseudomonas. Did have a prior history of MDRO and was treated with meropenem in house. Will be continued on a 7-day course of ciprofloxacin to ensure resolution. Abdominal wall cellulitis   Presented with fall and subsequent PEG malfunction. No abscess seen on CT. Evaluated by general surgery and no I&D or antibiotics indicated from their standpoint. Patient is on ciprofloxacin for his urine. Mood Disorder   Continue zoloft      Parkinson's disease  Has brain stimulator and PEG (needs to be replaced)      The patient expressed appropriate understanding of, and agreement with the discharge recommendations, medications, and plan.      Consults this admission:  IP CONSULT TO INTERVENTIONAL RADIOLOGY  IP CONSULT TO GENERAL SURGERY  IP CONSULT TO PHARMACY  IP 1 anterolisthesis of C7 on T1 DEGENERATIVE CHANGES: Multilevel moderate to severe degenerative change of the cervical spine. Solid osseous fusion of the C5 and C6 vertebral bodies. SOFT TISSUES: There is no prevertebral soft tissue swelling. No acute abnormality of the cervical spine. XR PELVIS (1-2 VIEWS)    Result Date: 10/9/2023  EXAMINATION: ONE XRAY VIEW OF THE PELVIS 10/9/2023 2:26 pm COMPARISON: None. HISTORY: CT abdomen pelvis May 6, 2011 FINDINGS: Single view of the pelvis was reviewed. Limited evaluation of the proximal right and left femur due to overlying osseous structures of the bilateral hands. No pelvic fracture identified. No fracture of the right or left femoral head or neck identified within limits of the exam.     1. No acute fracture of the pelvis are femoral heads identified. Evaluation of the proximal femurs is limited due to overlying osseous structures of the bilateral hands. XR CHEST PORTABLE    Result Date: 10/9/2023  EXAMINATION: ONE XRAY VIEW OF THE CHEST 10/9/2023 2:25 pm COMPARISON: 07/03/2017 HISTORY: ORDERING SYSTEM PROVIDED HISTORY: Fall TECHNOLOGIST PROVIDED HISTORY: Reason for exam:->Fall Reason for Exam: Fall Additional signs and symptoms: Fall Relevant Medical/Surgical History: Fall FINDINGS: Left basilar opacity. There is no effusion or pneumothorax. The cardiomediastinal silhouette is stable. The osseous structures are stable.      Bibasilar hypoaeration with left basilar opacities suggesting subsegmental atelectasis       CBC:   Recent Labs     10/11/23  0554 10/12/23  0320 10/13/23  0400   WBC 14.9* 18.4* 14.4*   HGB 10.5* 9.8* 9.8*    414 385     BMP:    Recent Labs     10/11/23  0554 10/12/23  0320 10/13/23  0400    138 136   K 5.0 4.2 4.2    103 103   CO2 21 27 26   BUN 18 16 14   CREATININE 0.5* 0.4* 0.3*   GLUCOSE 75 85 110*     Hepatic:   Recent Labs     10/11/23  0554 10/12/23  0320   AST 16 13*   ALT 15 14   BILITOT 0.4 0.3

## 2023-10-13 NOTE — CARE COORDINATION
Reviewed chart and discussed in IDR, discharge to [facility] today. Set up with [facility] for 1330 , updated Malinda pt's RN,  Neftali Chowdary at [facility] and pt's wife. Wife will meet pt at facility.

## 2023-10-15 LAB
CULTURE: NORMAL
CULTURE: NORMAL
Lab: NORMAL
Lab: NORMAL
SPECIMEN: NORMAL
SPECIMEN: NORMAL

## 2023-10-18 NOTE — PROGRESS NOTES
Physician Progress Note      PATIENT:               Maida Boeck  CSN #:                  [id number]  :                       1950  ADMIT DATE:       10/9/2023 12:59 PM  [address] [place] [address] DATE:        10/13/2023 2:58 PM  RESPONDING  PROVIDER #:        Tommie ROBERTS          QUERY TEXT:    Internal Medicine,    Pt admitted with GT malfunction and  has malnutrition documented by Internal   Medicine. Please further specify type of malnutrition with documentation in   the medical record. The medical record reflects the following:  Risk Factors: chronic illness  Clinical Indicators: Internal Medicine documents patient has G-tube for   malnutrition, no specificity is documented. The Dietitian documents severe   malnutrition with severe body mass and fat loss  Treatment: labs, GT replacement & feeding, I&O, weights    ASPEN Criteria:    https://[email or web address] onlinelibrary. [place]. com/doi/full/10.1177/453173430018796  5    Thank you,  Carmelo Li RN [facility]  [phone]  Options provided:  -- Severe Malnutrition  -- Other - I will add my own diagnosis  -- Disagree - Not applicable / Not valid  -- Disagree - Clinically unable to determine / Unknown  -- Refer to Clinical Documentation Reviewer    PROVIDER RESPONSE TEXT:    This patient has severe malnutrition.     Query created by: Carmelo Li on 10/18/2023 1:06 PM      Electronically signed by:  Bernardo Lares 10/18/2023 2:08 PM

## (undated) DEVICE — TUBING, SUCTION, 1/4" X 12', STRAIGHT: Brand: MEDLINE

## (undated) DEVICE — SURE SET-DOUBLE BASIN-LF: Brand: MEDLINE INDUSTRIES, INC.

## (undated) DEVICE — SOLUTION IV 1000ML 0.9% SOD CHL

## (undated) DEVICE — 3M™ SURGICAL CLIPPER WITH PIVOTING HEAD, 9660, 50/CASE: Brand: 3M™

## (undated) DEVICE — 3M™ IOBAN™ 2 ANTIMICROBIAL INCISE DRAPE 6640EZ: Brand: IOBAN™ 2

## (undated) DEVICE — BLANKET WRM W40.2XL55.9IN IORT LO BODY + MISTRAL AIR

## (undated) DEVICE — GLOVE ORTHO 8   MSG9480

## (undated) DEVICE — Device

## (undated) DEVICE — INTENDED FOR TISSUE SEPARATION, AND OTHER PROCEDURES THAT REQUIRE A SHARP SURGICAL BLADE TO PUNCTURE OR CUT.: Brand: BARD-PARKER ® CARBON RIB-BACK BLADES

## (undated) DEVICE — PLASMABLADE PS210-030S 3.0S LOCK: Brand: PLASMABLADE™

## (undated) DEVICE — SURGICAL SET UP - SURE SET: Brand: MEDLINE INDUSTRIES, INC.

## (undated) DEVICE — SUTURE VCRL SZ 2-0 L18IN ABSRB UD CT-1 L36MM 1/2 CIR J839D

## (undated) DEVICE — WAX SURG 2.5GM HEMSTAT BNE BEESWAX PARAFFIN ISO PALMITATE

## (undated) DEVICE — 3M™ STERI-DRAPE™ INSTRUMENT POUCH 1018: Brand: STERI-DRAPE™

## (undated) DEVICE — PROTECTOR ULN NRV PUR FOAM HK LOOP STRP ANATOMICALLY

## (undated) DEVICE — MARKER,SKIN,WI/RULER AND LABELS: Brand: MEDLINE

## (undated) DEVICE — COVER LT HNDL BLU PLAS

## (undated) DEVICE — APPLICATOR MEDICATED 26 CC SOLUTION HI LT ORNG CHLORAPREP

## (undated) DEVICE — STANDARD HYPODERMIC NEEDLE,POLYPROPYLENE HUB: Brand: MONOJECT

## (undated) DEVICE — JEWISH HOSPITAL TURNOVER KIT: Brand: MEDLINE INDUSTRIES, INC.

## (undated) DEVICE — PLATE ES AD W 9FT CRD 2

## (undated) DEVICE — SYRINGE IRRIG 60ML SFT PLIABLE BLB EZ TO GRP 1 HND USE W/

## (undated) DEVICE — GARMENT,MEDLINE,DVT,INT,CALF,MED, GEN2: Brand: MEDLINE